# Patient Record
Sex: FEMALE | Race: WHITE | Employment: FULL TIME | URBAN - NONMETROPOLITAN AREA
[De-identification: names, ages, dates, MRNs, and addresses within clinical notes are randomized per-mention and may not be internally consistent; named-entity substitution may affect disease eponyms.]

---

## 2018-01-22 ENCOUNTER — HOSPITAL ENCOUNTER (OUTPATIENT)
Dept: GENERAL RADIOLOGY | Age: 58
Discharge: HOME | End: 2018-01-22
Payer: COMMERCIAL

## 2018-01-22 ENCOUNTER — HOSPITAL ENCOUNTER (OUTPATIENT)
Age: 58
Discharge: HOME | End: 2018-01-22
Payer: COMMERCIAL

## 2018-01-22 DIAGNOSIS — R06.02 SOB (SHORTNESS OF BREATH): ICD-10-CM

## 2018-01-22 DIAGNOSIS — R09.89 CHEST CONGESTION: ICD-10-CM

## 2018-01-22 PROCEDURE — 71046 X-RAY EXAM CHEST 2 VIEWS: CPT

## 2018-09-07 ENCOUNTER — HOSPITAL ENCOUNTER (OUTPATIENT)
Dept: LAB | Age: 58
Discharge: HOME | End: 2018-09-07
Payer: COMMERCIAL

## 2018-09-07 LAB
THYROXINE, FREE: 1.6 NG/DL (ref 0.93–1.7)
TSH SERPL DL<=0.05 MIU/L-ACNC: 0.31 MIU/L (ref 0.3–5)

## 2018-09-07 PROCEDURE — 84439 ASSAY OF FREE THYROXINE: CPT

## 2018-09-07 PROCEDURE — 36415 COLL VENOUS BLD VENIPUNCTURE: CPT

## 2018-09-07 PROCEDURE — 84443 ASSAY THYROID STIM HORMONE: CPT

## 2018-10-03 ENCOUNTER — HOSPITAL ENCOUNTER (OUTPATIENT)
Dept: WOMENS IMAGING | Age: 58
Discharge: HOME | End: 2018-10-05
Payer: COMMERCIAL

## 2018-10-03 DIAGNOSIS — Z12.39 SCREENING BREAST EXAMINATION: ICD-10-CM

## 2018-10-03 PROCEDURE — 77067 SCR MAMMO BI INCL CAD: CPT

## 2018-10-22 ENCOUNTER — HOSPITAL ENCOUNTER (OUTPATIENT)
Dept: CT IMAGING | Age: 58
Discharge: HOME | End: 2018-10-24
Payer: COMMERCIAL

## 2018-10-22 DIAGNOSIS — J84.9 INTERSTITIAL LUNG DISEASE (HCC): ICD-10-CM

## 2018-10-22 PROCEDURE — 71260 CT THORAX DX C+: CPT

## 2018-10-22 PROCEDURE — 6360000004 HC RX CONTRAST MEDICATION: Performed by: FAMILY MEDICINE

## 2018-10-22 RX ADMIN — IOPAMIDOL 75 ML: 755 INJECTION, SOLUTION INTRAVENOUS at 14:13

## 2018-11-09 PROBLEM — E04.1 THYROID NODULE: Status: ACTIVE | Noted: 2018-11-09

## 2018-11-19 ENCOUNTER — HOSPITAL ENCOUNTER (OUTPATIENT)
Dept: ULTRASOUND IMAGING | Age: 58
Discharge: HOME | End: 2018-11-21
Payer: COMMERCIAL

## 2018-11-19 DIAGNOSIS — E04.1 THYROID NODULE: ICD-10-CM

## 2018-11-19 PROCEDURE — 76536 US EXAM OF HEAD AND NECK: CPT

## 2019-07-22 ENCOUNTER — TELEPHONE (OUTPATIENT)
Dept: ORTHOPEDIC SURGERY | Age: 59
End: 2019-07-22
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
## 2019-07-26 ENCOUNTER — TELEPHONE (OUTPATIENT)
Dept: ORTHOPEDIC SURGERY | Age: 59
End: 2019-07-26

## 2019-07-26 ENCOUNTER — OFFICE VISIT (OUTPATIENT)
Dept: ORTHOPEDIC SURGERY | Age: 59
End: 2019-07-26
Payer: COMMERCIAL

## 2019-07-26 VITALS — BODY MASS INDEX: 39.39 KG/M2 | HEIGHT: 62 IN | WEIGHT: 214.07 LBS

## 2019-07-26 DIAGNOSIS — S82.891A CLOSED FRACTURE OF RIGHT ANKLE, INITIAL ENCOUNTER: ICD-10-CM

## 2019-07-26 DIAGNOSIS — S52.571A OTHER CLOSED INTRA-ARTICULAR FRACTURE OF DISTAL END OF RIGHT RADIUS, INITIAL ENCOUNTER: Primary | ICD-10-CM

## 2019-07-26 PROCEDURE — 99203 OFFICE O/P NEW LOW 30 MIN: CPT | Performed by: ORTHOPAEDIC SURGERY

## 2019-07-26 ASSESSMENT — ENCOUNTER SYMPTOMS
CHEST TIGHTNESS: 0
COUGH: 0
VOMITING: 0
ABDOMINAL PAIN: 0
APNEA: 0

## 2019-07-26 NOTE — TELEPHONE ENCOUNTER
Patient was seen with you today (7/26/19) for ORIF right wrist and ORIF of right ankle DOS: 7/14/19. Patient had surgery by another Orthopedic surgeon in [facility]. The Nurse was asking about a stop date for her Lovenox. Please advise, thank you.
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
## 2019-07-31 ENCOUNTER — OFFICE VISIT (OUTPATIENT)
Dept: ORTHOPEDIC SURGERY | Age: 59
End: 2019-07-31
Payer: COMMERCIAL

## 2019-07-31 ENCOUNTER — TELEPHONE (OUTPATIENT)
Dept: ORTHOPEDIC SURGERY | Age: 59
End: 2019-07-31

## 2019-07-31 VITALS — WEIGHT: 209 LBS | BODY MASS INDEX: 38.46 KG/M2 | HEIGHT: 62 IN
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
## 2019-07-31 DIAGNOSIS — S52.571A OTHER CLOSED INTRA-ARTICULAR FRACTURE OF DISTAL END OF RIGHT RADIUS, INITIAL ENCOUNTER: Primary | ICD-10-CM

## 2019-07-31 DIAGNOSIS — S82.891A CLOSED FRACTURE OF RIGHT ANKLE, INITIAL ENCOUNTER: ICD-10-CM

## 2019-07-31 PROCEDURE — 99213 OFFICE O/P EST LOW 20 MIN: CPT | Performed by: ORTHOPAEDIC SURGERY

## 2019-07-31 RX ORDER — ACETAMINOPHEN 325 MG/1
650 TABLET ORAL EVERY 6 HOURS PRN
COMMUNITY
End: 2020-11-03

## 2019-07-31 RX ORDER — IBUPROFEN 200 MG
200 TABLET ORAL EVERY 6 HOURS PRN
COMMUNITY
End: 2021-02-08 | Stop reason: ALTCHOICE

## 2019-07-31 ASSESSMENT — ENCOUNTER SYMPTOMS
SHORTNESS OF BREATH: 0
BACK PAIN: 0
WHEEZING: 0
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
## 2019-09-04 ENCOUNTER — OFFICE VISIT (OUTPATIENT)
Dept: ORTHOPEDIC SURGERY | Age: 59
End: 2019-09-04
Payer: COMMERCIAL

## 2019-09-04 VITALS — HEIGHT: 62 IN | BODY MASS INDEX: 38.46 KG/M2 | WEIGHT: 209 LBS

## 2019-09-04 DIAGNOSIS — S82.891A CLOSED FRACTURE OF RIGHT ANKLE, INITIAL ENCOUNTER: ICD-10-CM

## 2019-09-04 DIAGNOSIS — S52.571A OTHER CLOSED INTRA-ARTICULAR FRACTURE OF DISTAL END OF RIGHT RADIUS, INITIAL ENCOUNTER: Primary | ICD-10-CM

## 2019-09-04 PROCEDURE — 20670 REMOVAL IMPLANT SUPERFICIAL: CPT | Performed by: ORTHOPAEDIC SURGERY

## 2019-09-04 PROCEDURE — 99213 OFFICE O/P EST LOW 20 MIN: CPT | Performed by: ORTHOPAEDIC SURGERY

## 2019-09-04 ASSESSMENT — ENCOUNTER SYMPTOMS
VOMITING: 0
APNEA: 0
COUGH: 0
CHEST TIGHTNESS: 0
ABDOMINAL PAIN: 0
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
## 2019-10-09 ENCOUNTER — OFFICE VISIT (OUTPATIENT)
Dept: ORTHOPEDIC SURGERY | Age: 59
End: 2019-10-09
Payer: COMMERCIAL

## 2019-10-09 VITALS — BODY MASS INDEX: 37.89 KG/M2 | HEIGHT: 62 IN | WEIGHT: 205.91 LBS

## 2019-10-09 DIAGNOSIS — S52.571A OTHER CLOSED INTRA-ARTICULAR FRACTURE OF DISTAL END OF RIGHT RADIUS, INITIAL ENCOUNTER: Primary | ICD-10-CM

## 2019-10-09 PROCEDURE — 99213 OFFICE O/P EST LOW 20 MIN: CPT | Performed by: ORTHOPAEDIC SURGERY

## 2019-10-09 RX ORDER — CELECOXIB 200 MG/1
200 CAPSULE ORAL DAILY
Qty: 60 CAPSULE | Refills: 2 | Status: SHIPPED | OUTPATIENT
Start: 2019-10-09 | End: 2020-10-09 | Stop reason: SDUPTHER

## 2019-10-09 ASSESSMENT — ENCOUNTER SYMPTOMS
SHORTNESS OF BREATH: 0
BACK PAIN: 0
WHEEZING: 0

## 2019-11-19 DIAGNOSIS — S52.571A OTHER CLOSED INTRA-ARTICULAR FRACTURE OF DISTAL END OF RIGHT RADIUS, INITIAL ENCOUNTER: Primary | ICD-10-CM

## 2019-11-20 ENCOUNTER — OFFICE VISIT (OUTPATIENT)
Dept: ORTHOPEDIC SURGERY | Age: 59
End: 2019-11-20
Payer: COMMERCIAL

## 2019-11-20 VITALS — HEIGHT: 62 IN | WEIGHT: 205.91 LBS | BODY MASS INDEX: 37.89 KG/M2
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
## 2019-11-20 DIAGNOSIS — S52.571A OTHER CLOSED INTRA-ARTICULAR FRACTURE OF DISTAL END OF RIGHT RADIUS, INITIAL ENCOUNTER: Primary | ICD-10-CM

## 2019-11-20 PROCEDURE — 99213 OFFICE O/P EST LOW 20 MIN: CPT | Performed by: ORTHOPAEDIC SURGERY

## 2019-11-20 ASSESSMENT — ENCOUNTER SYMPTOMS
DIARRHEA: 0
NAUSEA: 0
CONSTIPATION: 0
COUGH: 0

## 2020-11-03 PROBLEM — E11.9 TYPE 2 DIABETES MELLITUS, WITHOUT LONG-TERM CURRENT USE OF INSULIN (HCC): Status: ACTIVE | Noted: 2020-11-03

## 2020-11-03 PROBLEM — I10 HYPERTENSION: Status: ACTIVE | Noted: 2020-11-03

## 2020-11-20 ENCOUNTER — HOSPITAL ENCOUNTER (OUTPATIENT)
Dept: LAB | Age: 60
Setting detail: SPECIMEN
Discharge: HOME | End: 2020-11-20

## 2020-11-20 PROCEDURE — C9803 HOPD COVID-19 SPEC COLLECT: HCPCS

## 2020-11-20 PROCEDURE — U0003 INFECTIOUS AGENT DETECTION BY NUCLEIC ACID (DNA OR RNA); SEVERE ACUTE RESPIRATORY SYNDROME CORONAVIRUS 2 (SARS-COV-2) (CORONAVIRUS DISEASE [COVID-19]), AMPLIFIED PROBE TECHNIQUE, MAKING USE OF HIGH THROUGHPUT TECHNOLOGIES AS DESCRIBED BY CMS-2020-01-R: HCPCS

## 2020-11-23 LAB — SARS-COV-2, NAA: NOT DETECTED

## 2021-05-07 ENCOUNTER — OFFICE VISIT (OUTPATIENT)
Dept: PRIMARY CARE CLINIC | Age: 61
End: 2021-05-07
Payer: COMMERCIAL

## 2021-05-07 VITALS
HEART RATE: 76 BPM | RESPIRATION RATE: 16 BRPM | WEIGHT: 221.4 LBS | DIASTOLIC BLOOD PRESSURE: 76 MMHG | SYSTOLIC BLOOD PRESSURE: 134 MMHG | BODY MASS INDEX: 39.85 KG/M2

## 2021-05-07 DIAGNOSIS — L08.9 SKIN INFECTION: ICD-10-CM

## 2021-05-07 DIAGNOSIS — Z12.31 ENCOUNTER FOR SCREENING MAMMOGRAM FOR MALIGNANT NEOPLASM OF BREAST: ICD-10-CM

## 2021-05-07 DIAGNOSIS — E11.9 TYPE 2 DIABETES MELLITUS WITHOUT COMPLICATION, WITHOUT LONG-TERM CURRENT USE OF INSULIN (HCC): ICD-10-CM

## 2021-05-07 DIAGNOSIS — I10 ESSENTIAL HYPERTENSION: Primary | ICD-10-CM

## 2021-05-07 PROCEDURE — 3051F HG A1C>EQUAL 7.0%<8.0%: CPT | Performed by: FAMILY MEDICINE

## 2021-05-07 PROCEDURE — 99213 OFFICE O/P EST LOW 20 MIN: CPT | Performed by: FAMILY MEDICINE

## 2021-05-07 RX ORDER — EMPAGLIFLOZIN AND METFORMIN HYDROCHLORIDE 12.5; 5 MG/1; MG/1
1 TABLET ORAL DAILY
Qty: 90 TABLET | Refills: 0 | Status: SHIPPED | OUTPATIENT
Start: 2021-05-07 | End: 2021-07-30 | Stop reason: SDUPTHER

## 2021-05-07 NOTE — PROGRESS NOTES
Anjelica Lindo is a 64 y.o. female here for routine follow up of diabetes. She has been checking  her blood glucose levels regularly. She said said it has varied between 108-134. She reports numbness and tingling in her arms. She thinks she may have a pinched nerve. She has been compliant with diet. She has been compliant with her medications. She is tolerating medications. Hypertension: Patient here for follow-up of elevated blood pressure. She is not exercising and is adherent to low salt diet. Blood pressure is not well controlled at home. Patient does not take blood pressure at home. Cardiac symptoms none. Patient denies chest pain, fatigue and palpitations. Cardiovascular risk factors: diabetes mellitus, hypertension and obesity (BMI >= 30 kg/m2). Use of agents associated with hypertension: none. Patient states that she has a bump on her face. She is unsure if it is a pimple or if she got bit by a spider so she would like the doctor to look at it. She said her mask also sits right where it is. She said that she has not been wearing making and uses toner every night. Allergies:  Hydrocodone-acetaminophen and Penicillin g    Past Medical History:    Past Medical History:   Diagnosis Date    Obesity        Past Surgical History:    No past surgical history on file. Social History:   Social History     Tobacco Use    Smoking status: Former Smoker     Packs/day: 2.00     Years: 25.00     Pack years: 50.00     Quit date: 10/1/2018     Years since quittin.6    Smokeless tobacco: Never Used   Substance Use Topics    Alcohol use: Not on file       Family History:   No family history on file.       Review of Systems:  Constitutional: negative for fever or chills  Eyes: negative for visual disturbance   ENT: negative for sore throat or nasal congestion  Respiratory: negative for cough, shortness of breath and sputum  Cardiovascular: negative for chest pain,pnd,claudication or complication, without long-term current use of insulin ([facility] [address]     3. Encounter for screening mammogram for malignant neoplasm of breast  ROSEANN DIGITAL SCREEN W OR WO CAD BILATERAL   4. Skin infection         · Check BS 1 times per day  · Medication change: none  · Encouraged low fat, low sodium and diabetic, 1800 calorie diet. · Goal for blood pressure control is 120/80  · Recommended regular exercise as tolerated, 5 times per week  · Labs reviewed   · Labs ordered:   ascivd risk  Orders Placed This Encounter   Procedures    ROSEANN DIGITAL SCREEN W OR WO CAD BILATERAL     Standing Status:   Future     Standing Expiration Date:   7/6/2022     Order Specific Question:   Reason for exam:     Answer:   screening       Current Outpatient Medications   Medication Sig Dispense Refill    Empagliflozin-metFORMIN HCl (SYNJARDY) 12.5-500 MG TABS Take 1 tablet by mouth daily 90 tablet 0    Multiple Vitamins-Minerals (HAIR SKIN AND NAILS FORMULA PO) Take by mouth      mupirocin (BACTROBAN) 2 % ointment Apply 3 times daily. 1 Tube 0    sertraline (ZOLOFT) 50 MG tablet Take 1 tablet by mouth daily 90 tablet 0    losartan (COZAAR) 25 MG tablet TAKE ONE TABLET BY MOUTH DAILY 90 tablet 0    Calcium Carbonate-Vitamin D (OYSTER SHELL CALCIUM/D) 500-200 MG-UNIT TABS Take 1 tablet by mouth 2 times daily (with meals)      ACETAMINOPHEN PO Take by mouth as needed      metFORMIN (GLUCOPHAGE) 500 MG tablet Take 1 tablet by mouth 2 times daily (with meals) (Patient not taking: Reported on 5/7/2021) 180 tablet 0    Glucos-Chond-Hyal Ac-Ca Fructo (MOVE FREE JOINT HEALTH ADVANCE PO) Take by mouth daily      Biotin 10 MG tablet Take 10 mg by mouth daily       No current facility-administered medications for this visit. No follow-ups on file.       Electronically signed by Isadora Wilder MD on 5/7/2021 at 9:33 AM

## 2021-06-14 RX ORDER — LOSARTAN POTASSIUM 25 MG/1
TABLET ORAL
Qty: 90 TABLET | Refills: 0 | Status: SHIPPED | OUTPATIENT
Start: 2021-06-14 | End: 2021-09-13 | Stop reason: SDUPTHER

## 2021-07-12 ENCOUNTER — TELEPHONE (OUTPATIENT)
Dept: FAMILY MEDICINE CLINIC | Age: 61
End: 2021-07-12

## 2021-07-12 NOTE — TELEPHONE ENCOUNTER
Berta Nava was contacted as part of mammography outreach. Message left on machine for patient to return call to schedule appointment.

## 2021-07-30 RX ORDER — EMPAGLIFLOZIN AND METFORMIN HYDROCHLORIDE 12.5; 5 MG/1; MG/1
1 TABLET ORAL DAILY
Qty: 90 TABLET | Refills: 0 | Status: SHIPPED | OUTPATIENT
Start: 2021-07-30 | End: 2021-09-13 | Stop reason: SDUPTHER

## 2021-08-03 LAB
ABSOLUTE BASO #: 0.2 X10E9/L (ref 0–0.2)
ABSOLUTE EOS #: 0.2 X10E9/L (ref 0–0.4)
ABSOLUTE LYMPH #: 3.3 X10E9/L (ref 1–3.5)
ABSOLUTE MONO #: 0.4 X10E9/L (ref 0–0.9)
ABSOLUTE NEUT #: 3 X10E9/L (ref 1.5–6.6)
ALBUMIN SERPL-MCNC: 4.1 G/DL (ref 3.2–5.3)
ALK PHOSPHATASE: 69 U/L (ref 39–130)
ALT SERPL-CCNC: 61 U/L (ref 0–31)
ANION GAP SERPL CALCULATED.3IONS-SCNC: 9 MMOL/L (ref 5–15)
AST SERPL-CCNC: 51 U/L (ref 0–41)
BASOPHILS RELATIVE PERCENT: 2.4 %
BILIRUB SERPL-MCNC: 0.6 MG/DL (ref 0.3–1.2)
BUN BLDV-MCNC: 14 MG/DL (ref 5–23)
CALCIUM SERPL-MCNC: 8.9 MG/DL (ref 8.5–10.5)
CHLORIDE BLD-SCNC: 105 MMOL/L (ref 98–109)
CHOLESTEROL/HDL RATIO: 4.7 (ref 1–5)
CHOLESTEROL: 177 MG/DL (ref 150–200)
CO2: 24 MMOL/L (ref 22–32)
CREAT SERPL-MCNC: 0.67 MG/DL (ref 0.4–1)
CREATINE, URINE: 58.79 MG/DL
EGFR AFRICAN AMERICAN: >60 ML/MIN/1.73SQ.M
EGFR IF NONAFRICAN AMERICAN: >60 ML/MIN/1.73SQ.M
EOSINOPHILS RELATIVE PERCENT: 2.6 %
GLUCOSE: 131 MG/DL (ref 65–99)
HCT VFR BLD CALC: 47 % (ref 35–47)
HDLC SERPL-MCNC: 38 MG/DL
HEMOGLOBIN: 15.8 G/DL (ref 11.7–15.5)
LDL CHOLESTEROL CALCULATED: 108 MG/DL
LDL/HDL RATIO: 2.8
LYMPHOCYTE %: 46.2 %
MCH RBC QN AUTO: 29.6 PG (ref 27–34)
MCHC RBC AUTO-ENTMCNC: 33.7 G/DL (ref 32–36)
MCV RBC AUTO: 88 FL (ref 80–100)
MICROALBUMIN/CREAT 24H UR: 1 MG/DL (ref 0–1.9)
MICROALBUMIN/CREAT UR-RTO: 17 MG/G CREAT (ref 0–30)
MONOCYTES # BLD: 6.2 %
NEUTROPHILS RELATIVE PERCENT: 42.6 %
PDW BLD-RTO: 14.8 % (ref 11.5–15)
PLATELETS: 161 X10E9/L (ref 150–450)
PMV BLD AUTO: 9.2 FL (ref 7–12)
POTASSIUM SERPL-SCNC: 4.2 MMOL/L (ref 3.5–5)
RBC: 5.36 X10E12/L (ref 3.8–5.2)
SODIUM BLD-SCNC: 138 MMOL/L (ref 134–146)
TOTAL PROTEIN: 7.5 G/DL (ref 6–8)
TRIGL SERPL-MCNC: 157 MG/DL (ref 27–150)
VLDLC SERPL CALC-MCNC: 31 MG/DL (ref 0–30)
WBC: 7.1 X10E9/L (ref 4–11)

## 2021-08-16 ENCOUNTER — OFFICE VISIT (OUTPATIENT)
Dept: PRIMARY CARE CLINIC | Age: 61
End: 2021-08-16
Payer: COMMERCIAL

## 2021-08-16 VITALS
DIASTOLIC BLOOD PRESSURE: 76 MMHG | SYSTOLIC BLOOD PRESSURE: 130 MMHG | RESPIRATION RATE: 16 BRPM | BODY MASS INDEX: 40.14 KG/M2 | HEART RATE: 76 BPM | WEIGHT: 223 LBS

## 2021-08-16 DIAGNOSIS — E11.9 TYPE 2 DIABETES MELLITUS WITHOUT COMPLICATION, WITHOUT LONG-TERM CURRENT USE OF INSULIN (HCC): Primary | ICD-10-CM

## 2021-08-16 DIAGNOSIS — Z12.11 SCREENING FOR MALIGNANT NEOPLASM OF COLON: ICD-10-CM

## 2021-08-16 DIAGNOSIS — I10 ESSENTIAL HYPERTENSION: ICD-10-CM

## 2021-08-16 LAB
CONTROL: PRESENT
HBA1C MFR BLD: 7.4 %
HEMOCCULT STL QL: POSITIVE

## 2021-08-16 PROCEDURE — 83036 HEMOGLOBIN GLYCOSYLATED A1C: CPT | Performed by: FAMILY MEDICINE

## 2021-08-16 PROCEDURE — 3051F HG A1C>EQUAL 7.0%<8.0%: CPT | Performed by: FAMILY MEDICINE

## 2021-08-16 PROCEDURE — 99214 OFFICE O/P EST MOD 30 MIN: CPT | Performed by: FAMILY MEDICINE

## 2021-08-16 PROCEDURE — 82274 ASSAY TEST FOR BLOOD FECAL: CPT | Performed by: FAMILY MEDICINE

## 2021-08-16 RX ORDER — ATORVASTATIN CALCIUM 20 MG/1
20 TABLET, FILM COATED ORAL DAILY
Qty: 30 TABLET | Refills: 2 | Status: SHIPPED | OUTPATIENT
Start: 2021-08-16 | End: 2021-09-13 | Stop reason: SDUPTHER

## 2021-08-16 SDOH — ECONOMIC STABILITY: TRANSPORTATION INSECURITY
IN THE PAST 12 MONTHS, HAS LACK OF TRANSPORTATION KEPT YOU FROM MEETINGS, WORK, OR FROM GETTING THINGS NEEDED FOR DAILY LIVING?: NO

## 2021-08-16 SDOH — ECONOMIC STABILITY: TRANSPORTATION INSECURITY
IN THE PAST 12 MONTHS, HAS THE LACK OF TRANSPORTATION KEPT YOU FROM MEDICAL APPOINTMENTS OR FROM GETTING MEDICATIONS?: NO

## 2021-08-16 SDOH — ECONOMIC STABILITY: FOOD INSECURITY: WITHIN THE PAST 12 MONTHS, THE FOOD YOU BOUGHT JUST DIDN'T LAST AND YOU DIDN'T HAVE MONEY TO GET MORE.: NEVER TRUE

## 2021-08-16 SDOH — ECONOMIC STABILITY: FOOD INSECURITY: WITHIN THE PAST 12 MONTHS, YOU WORRIED THAT YOUR FOOD WOULD RUN OUT BEFORE YOU GOT MONEY TO BUY MORE.: NEVER TRUE

## 2021-08-16 ASSESSMENT — SOCIAL DETERMINANTS OF HEALTH (SDOH): HOW HARD IS IT FOR YOU TO PAY FOR THE VERY BASICS LIKE FOOD, HOUSING, MEDICAL CARE, AND HEATING?: NOT HARD AT ALL

## 2021-08-16 ASSESSMENT — PATIENT HEALTH QUESTIONNAIRE - PHQ9
SUM OF ALL RESPONSES TO PHQ9 QUESTIONS 1 & 2: 0
1. LITTLE INTEREST OR PLEASURE IN DOING THINGS: 0
SUM OF ALL RESPONSES TO PHQ QUESTIONS 1-9: 0
SUM OF ALL RESPONSES TO PHQ QUESTIONS 1-9: 0
2. FEELING DOWN, DEPRESSED OR HOPELESS: 0
SUM OF ALL RESPONSES TO PHQ QUESTIONS 1-9: 0
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
## 2021-08-16 NOTE — PATIENT INSTRUCTIONS
SURVEY:    You may be receiving a survey from [employer] regarding your visit today. You may get this in the mail, through your MyChart, or in your email. Please complete the survey to enable us to provide the highest quality of care to you and your family. If you cannot score us a very good (5 Stars) on any question, please call the office to discuss how we could of made your experience exceptional.    Thank you!     SOREN Deshpande RN Izella Para, [address] [place] [address] Tomi Rojas    Phone: [phone]  Fax: [phone]    Office Hours:   Lluviacoby DelmyErinazar, F: 8-5 Wednesday: 9-11

## 2021-09-13 RX ORDER — EMPAGLIFLOZIN AND METFORMIN HYDROCHLORIDE 12.5; 5 MG/1; MG/1
1 TABLET ORAL DAILY
Qty: 90 TABLET | Refills: 0 | Status: SHIPPED | OUTPATIENT
Start: 2021-09-13 | End: 2021-10-25 | Stop reason: SDUPTHER

## 2021-09-13 RX ORDER — ATORVASTATIN CALCIUM 20 MG/1
20 TABLET, FILM COATED ORAL DAILY
Qty: 30 TABLET | Refills: 2 | Status: SHIPPED | OUTPATIENT
Start: 2021-09-13 | End: 2021-10-25 | Stop reason: SDUPTHER

## 2021-09-13 RX ORDER — LOSARTAN POTASSIUM 25 MG/1
TABLET ORAL
Qty: 90 TABLET | Refills: 0 | Status: SHIPPED | OUTPATIENT
Start: 2021-09-13 | End: 2021-11-29 | Stop reason: SDUPTHER

## 2021-10-19 ENCOUNTER — TELEPHONE (OUTPATIENT)
Dept: PRIMARY CARE CLINIC | Age: 61
End: 2021-10-19

## 2021-10-19 NOTE — TELEPHONE ENCOUNTER
This nurse called and left a message for the patient, requesting the patient call this nurse back at [phone].

## 2021-10-25 RX ORDER — EMPAGLIFLOZIN AND METFORMIN HYDROCHLORIDE 12.5; 5 MG/1; MG/1
1 TABLET ORAL DAILY
Qty: 90 TABLET | Refills: 0 | Status: SHIPPED | OUTPATIENT
Start: 2021-10-25 | End: 2021-12-30 | Stop reason: SDUPTHER

## 2021-10-25 RX ORDER — ATORVASTATIN CALCIUM 20 MG/1
20 TABLET, FILM COATED ORAL DAILY
Qty: 30 TABLET | Refills: 2 | Status: SHIPPED | OUTPATIENT
Start: 2021-10-25 | End: 2021-11-29 | Stop reason: SDUPTHER

## 2021-11-16 ENCOUNTER — OFFICE VISIT (OUTPATIENT)
Dept: PRIMARY CARE CLINIC | Age: 61
End: 2021-11-16
Payer: COMMERCIAL

## 2021-11-16 VITALS
WEIGHT: 221.8 LBS | SYSTOLIC BLOOD PRESSURE: 125 MMHG | HEART RATE: 84 BPM | DIASTOLIC BLOOD PRESSURE: 87 MMHG | BODY MASS INDEX: 39.92 KG/M2

## 2021-11-16 DIAGNOSIS — Z23 NEED FOR INFLUENZA VACCINATION: ICD-10-CM

## 2021-11-16 DIAGNOSIS — I10 ESSENTIAL HYPERTENSION: ICD-10-CM

## 2021-11-16 DIAGNOSIS — E11.9 TYPE 2 DIABETES MELLITUS WITHOUT COMPLICATION, WITHOUT LONG-TERM CURRENT USE OF INSULIN (HCC): Primary | ICD-10-CM

## 2021-11-16 LAB — HBA1C MFR BLD: 7.4 %

## 2021-11-16 PROCEDURE — 3051F HG A1C>EQUAL 7.0%<8.0%: CPT | Performed by: FAMILY MEDICINE

## 2021-11-16 PROCEDURE — 83036 HEMOGLOBIN GLYCOSYLATED A1C: CPT | Performed by: FAMILY MEDICINE

## 2021-11-16 PROCEDURE — 90471 IMMUNIZATION ADMIN: CPT | Performed by: FAMILY MEDICINE

## 2021-11-16 PROCEDURE — 90674 CCIIV4 VAC NO PRSV 0.5 ML IM: CPT | Performed by: FAMILY MEDICINE

## 2021-11-16 PROCEDURE — 99213 OFFICE O/P EST LOW 20 MIN: CPT | Performed by: FAMILY MEDICINE

## 2021-11-16 ASSESSMENT — PATIENT HEALTH QUESTIONNAIRE - PHQ9
SUM OF ALL RESPONSES TO PHQ QUESTIONS 1-9: 0
SUM OF ALL RESPONSES TO PHQ QUESTIONS 1-9: 0
SUM OF ALL RESPONSES TO PHQ9 QUESTIONS 1 & 2: 0
1. LITTLE INTEREST OR PLEASURE IN DOING THINGS: 0
SUM OF ALL RESPONSES TO PHQ QUESTIONS 1-9: 0
2. FEELING DOWN, DEPRESSED OR HOPELESS: 0

## 2021-11-16 NOTE — PROGRESS NOTES
Casimiro Delacruz is a 64 y.o. female here for routine follow up of diabetes. She has been checking  her blood glucose levels regularly. She denies numbness and tingling in the hands and feet. She has been compliant with diet and exercise. She has been compliant with her medications. She is tolerating medications. Hypertension: Patient here for follow-up of elevated blood pressure. She is exercising and trying adherent to low salt diet. Blood pressure is not well controlled at home. Cardiac symptoms none. Patient denies chest pain, fatigue and palpitations. Cardiovascular risk factors: diabetes mellitus, hypertension and obesity (BMI >= 30 kg/m2). Use of agents associated with hypertension: none. Patient states she thinks she is suppose to take Lipitor at night and says she did take it for the first few nights and says she felt it was causing restless leg syndrome. Patient states she is taking it in the morning and says she is fine. Patient states she wants to know if she can take Acetaminophen if she has a  Headache. Patient states she had a headache last night and says she didn't wake up with it. Patient states she has been trying to get off [employer] OTC sleep aid and says she sleeps well and says she bought the Melatonin to see if that was better for her and says she is taking 3 Melatonin 5 mg every night. Patient states she would like to talk about the Shingles Vaccine and Pneumonia Shot. RECHECK B/P 144/71        Allergies:  Hydrocodone-acetaminophen, Penicillin g, and Codeine    Past Medical History:    Past Medical History:   Diagnosis Date    Obesity        Past Surgical History:    No past surgical history on file.     Social History:   Social History     Tobacco Use    Smoking status: Former Smoker     Packs/day: 2.00     Years: 25.00     Pack years: 50.00     Quit date: 10/1/2018     Years since quitting: 3.1    Smokeless tobacco: Never Used   Substance Use Topics    Alcohol use: Not on file       Family History:   No family history on file. Review of Systems:  Constitutional: negative for fever or chills  Eyes: negative for visual disturbance   ENT: negative for sore throat or nasal congestion  Respiratory: negative for cough, shortness of breath and sputum  Cardiovascular: negative for chest pain,pnd,claudication or palpitations  Gastrointestinal: negative for abd pain, lucy,nausea, vomiting, diarrhea or constipation  Genitourinary: negative for dysuria,,hematuria urgency or frequency  Musculoskeletal:negative for arthralgias, muscle weakness and stiff joints   Integument/breast: negative for skin rash or lesions  Neurological: negtive for   numbness and tingling. Psych: negative for anxiety, depression, suicidial ideation and suicidal attempt. Objective:  Physical Exam:  /87 (Site: Left Upper Arm, Position: Sitting) Comment: reheck  Pulse 84   Wt 221 lb 12.8 oz (100.6 kg)   BMI 39.92 kg/m²   GEN:   She is alert and oriented  ENT:    ENT exam normal, no neck nodes or sinus tenderness  NECK:   neck supple and non tender without mass, no thyromegaly or thyroid nodules, no cervical lymphadenopathy  EYES:   No gross abnormalities. CVS:     CVS exam BP noted to be well controlled today in office, S1, S2 normal, no gallop, no murmur, chest clear, no JVD, no HSM, no edema  PULM:   chest clear, no wheezing, rales, normal symmetric air entry, no tachypnea, retractions or cyanosis  ABD:   exam deferred  MUSC: no joint tenderness, deformity or swelling  Skin : neg for  rash or lesions  EXT: {EXTREMITIES EXAM:20816::\"Extremities: + 2 pedal pulses, no edema or calf tenderness, and warm to touch.   FEET:  no open sores are present bilaterally  NEURO:DTR -normal        Diagnostic Data:  Lab Results   Component Value Date    GLUCOSE 131 (H) 08/02/2021    LABA1C 7.4 08/16/2021    BUN 14 08/02/2021     08/02/2021    K 4.2 08/02/2021    CALCIUM 8.9 08/02/2021     08/02/2021    CO2 24 08/02/2021       Assessment:  1. Type 2 diabetes mellitus without complication, without long-term current use of insulin ([address]    2. Essential hypertension      Plan   Diagnosis Orders   1. Type 2 diabetes mellitus without complication, without long-term current use of insulin ([address] - well controlled with synjardy    2. Essential hypertension - well controlled with cozaar        · Check BS 1 times per day  · Medication change: none  · Encouraged low fat, low sodium and diabetic, 1800 calorie diet. · Goal for blood pressure control is 130/80  · Recommended regular exercise as tolerated, 5 times per week  · Labs reviewed- [id number]  · Flu vacine  ascivd risk  No orders of the defined types were placed in this encounter. Current Outpatient Medications   Medication Sig Dispense Refill    Empagliflozin-metFORMIN HCl (SYNJARDY) 12.5-500 MG TABS Take 1 tablet by mouth daily 90 tablet 0    atorvastatin (LIPITOR) 20 MG tablet Take 1 tablet by mouth daily 30 tablet 2    sertraline (ZOLOFT) 50 MG tablet Take 1 tablet by mouth daily 90 tablet 0    losartan (COZAAR) 25 MG tablet TAKE ONE TABLET BY MOUTH DAILY 90 tablet 0    Multiple Vitamins-Minerals (HAIR SKIN AND NAILS FORMULA PO) Take by mouth      Glucos-Chond-Hyal Ac-Ca Fructo (MOVE FREE JOINT HEALTH ADVANCE PO) Take by mouth daily      ACETAMINOPHEN PO Take by mouth as needed       No current facility-administered medications for this visit. No follow-ups on file.       Electronically signed by Susan Mary MD on 11/16/2021 at 9:54 AM

## 2021-11-16 NOTE — PATIENT INSTRUCTIONS
SURVEY:    You may be receiving a survey from [employer] regarding your visit today. You may get this in the mail, through your MyChart, or in your email. Please complete the survey to enable us to provide the highest quality of care to you and your family. If you cannot score us a very good (5 Stars) on any question, please call the office to discuss how we could of made your experience exceptional.    Thank you!     Dr. Edwardo Wilcox, SOREN Shea, JESSICA Victor, [address] [place] [address], [address]    Phone: [phone]  Fax: [phone]    Office Hours:   Serg Riggins, F: 8-5 Wednesday: 9-11

## 2021-11-29 RX ORDER — LOSARTAN POTASSIUM 25 MG/1
TABLET ORAL
Qty: 90 TABLET | Refills: 0 | Status: SHIPPED | OUTPATIENT
Start: 2021-11-29 | End: 2022-03-15 | Stop reason: SDUPTHER

## 2021-11-29 RX ORDER — ATORVASTATIN CALCIUM 20 MG/1
20 TABLET, FILM COATED ORAL DAILY
Qty: 90 TABLET | Refills: 0 | Status: SHIPPED | OUTPATIENT
Start: 2021-11-29 | End: 2022-03-04 | Stop reason: SDUPTHER

## 2021-12-30 RX ORDER — EMPAGLIFLOZIN AND METFORMIN HYDROCHLORIDE 12.5; 5 MG/1; MG/1
1 TABLET ORAL DAILY
Qty: 90 TABLET | Refills: 0 | Status: SHIPPED | OUTPATIENT
Start: 2021-12-30 | End: 2022-02-07 | Stop reason: SDUPTHER

## 2021-12-30 NOTE — TELEPHONE ENCOUNTER
Alessandro LERMA [facility] Clinical Staff  Subject: Refill Request     QUESTIONS   Name of Medication? sertraline (ZOLOFT) 50 MG tablet   Patient-reported dosage and instructions? Takes once a day 90 day supply   How many days do you have left? 10   Preferred Pharmacy? [address] phone number (if available)? [phone]   ---------------------------------------------------------------------------   --------------,   Name of Medication? Empagliflozin-metFORMIN HCl (SYNJARDY) 12.5-500 MG   TABS   Patient-reported dosage and instructions? Takes once a day 90 day supply   How many days do you have left? 18   Preferred Pharmacy? [address] phone number (if available)? [phone]   ---------------------------------------------------------------------------   --------------   CALL BACK INFO   What is the best way for the office to contact you? OK to leave message on   voicemail   Preferred Call Back Phone Number?  [phone]

## 2022-02-07 RX ORDER — EMPAGLIFLOZIN AND METFORMIN HYDROCHLORIDE 12.5; 5 MG/1; MG/1
1 TABLET ORAL DAILY
Qty: 90 TABLET | Refills: 0 | Status: SHIPPED | OUTPATIENT
Start: 2022-02-07 | End: 2022-04-25 | Stop reason: SDUPTHER

## 2022-02-07 NOTE — TELEPHONE ENCOUNTER
Roma LERMA [facility] Clinical Staff  Subject: Refill Request     QUESTIONS   Name of Medication? Empagliflozin-metFORMIN HCl (SYNJARDY) 12.5-500 MG   TABS   Patient-reported dosage and instructions? 12.5-500mg once a day   How many days do you have left? 4   Preferred Pharmacy? [address] [facility] phone number (if available)? [phone]   ---------------------------------------------------------------------------   --------------   CALL BACK INFO   What is the best way for the office to contact you? OK to leave message on   voicemail   Preferred Call Back Phone Number?  [phone]

## 2022-03-04 RX ORDER — ATORVASTATIN CALCIUM 20 MG/1
20 TABLET, FILM COATED ORAL DAILY
Qty: 90 TABLET | Refills: 0 | Status: SHIPPED
Start: 2022-03-04 | End: 2022-04-25 | Stop reason: SINTOL

## 2022-03-15 RX ORDER — LOSARTAN POTASSIUM 25 MG/1
TABLET ORAL
Qty: 90 TABLET | Refills: 0 | Status: SHIPPED | OUTPATIENT
Start: 2022-03-15 | End: 2022-06-06 | Stop reason: SDUPTHER

## 2022-04-25 ENCOUNTER — OFFICE VISIT (OUTPATIENT)
Dept: PRIMARY CARE CLINIC | Age: 62
End: 2022-04-25
Payer: COMMERCIAL

## 2022-04-25 VITALS
WEIGHT: 229 LBS | SYSTOLIC BLOOD PRESSURE: 134 MMHG | DIASTOLIC BLOOD PRESSURE: 82 MMHG | HEIGHT: 62 IN | RESPIRATION RATE: 16 BRPM | HEART RATE: 94 BPM | BODY MASS INDEX: 42.14 KG/M2

## 2022-04-25 DIAGNOSIS — E11.9 TYPE 2 DIABETES MELLITUS WITHOUT COMPLICATION, WITHOUT LONG-TERM CURRENT USE OF INSULIN (HCC): Primary | ICD-10-CM

## 2022-04-25 DIAGNOSIS — I10 ESSENTIAL HYPERTENSION: ICD-10-CM

## 2022-04-25 LAB — HBA1C MFR BLD: 7.9 %

## 2022-04-25 PROCEDURE — 3051F HG A1C>EQUAL 7.0%<8.0%: CPT | Performed by: FAMILY MEDICINE

## 2022-04-25 PROCEDURE — 83036 HEMOGLOBIN GLYCOSYLATED A1C: CPT | Performed by: FAMILY MEDICINE

## 2022-04-25 PROCEDURE — 99214 OFFICE O/P EST MOD 30 MIN: CPT | Performed by: FAMILY MEDICINE

## 2022-04-25 RX ORDER — ROSUVASTATIN CALCIUM 5 MG/1
5 TABLET, COATED ORAL NIGHTLY
Qty: 30 TABLET | Refills: 2 | Status: SHIPPED | OUTPATIENT
Start: 2022-04-25 | End: 2022-07-26 | Stop reason: SDUPTHER

## 2022-04-25 RX ORDER — EMPAGLIFLOZIN AND METFORMIN HYDROCHLORIDE 12.5; 5 MG/1; MG/1
1 TABLET ORAL DAILY
Qty: 180 TABLET | Refills: 0 | Status: SHIPPED | OUTPATIENT
Start: 2022-04-25 | End: 2022-05-20 | Stop reason: SDUPTHER

## 2022-04-25 NOTE — PROGRESS NOTES
Julia Lamar is a 63 y.o. female here for routine follow up of diabetes. She has been checking  her blood glucose levels regularly. She states it was 155 this morning. She denies numbness and tingling in the hands and feet. She has been compliant with diet as far as the sugar. She has been compliant with her medications. She is tolerating medications. Hypertension: Patient here for follow-up of elevated blood pressure. She is not exercising and is adherent to low salt diet. Blood pressure is not well controlled at home. Patient does not check her blood pressure at home. Cardiac symptoms none. Patient denies chest pain, fatigue and palpitations. Cardiovascular risk factors: diabetes mellitus, hypertension and obesity (BMI >= 30 kg/m2). Use of agents associated with hypertension: none. Patient states that her cholesterol medication is making her fatigued. Allergies:  Hydrocodone-acetaminophen, Penicillin g, and Codeine    Past Medical History:    Past Medical History:   Diagnosis Date    Obesity        Past Surgical History:    No past surgical history on file. Social History:   Social History     Tobacco Use    Smoking status: Former Smoker     Packs/day: 2.00     Years: 25.00     Pack years: 50.00     Quit date: 10/1/2018     Years since quitting: 3.5    Smokeless tobacco: Never Used   Substance Use Topics    Alcohol use: Not on file       Family History:   No family history on file.       Review of Systems:  Constitutional: negative for fever or chills  Eyes: negative for visual disturbance   ENT: negative for sore throat or nasal congestion  Respiratory: negative for cough, shortness of breath and sputum  Cardiovascular: negative for chest pain,pnd,claudication or palpitations  Gastrointestinal: negative for abd pain, lucy,nausea, vomiting, diarrhea or constipation  Genitourinary: negative for dysuria,,hematuria urgency or frequency  Musculoskeletal:negative for 1 times per day  · Medication change: d/c lipitor due to fatigue,start crestor 5 mg daily  · Encouraged low fat, low sodium and diabetic, 1800 calorie diet. · Goal for blood pressure control is 120/80  · Recommended regular exercise as tolerated, 5 times per week  · Labs reviewed -hba1c 7.9  · Labs ordered: tsh  ascivd risk  Orders Placed This Encounter   Procedures    TSH With Reflex Ft4     Standing Status:   Future     Standing Expiration Date:   4/25/2023    POCT glycosylated hemoglobin (Hb A1C)       Current Outpatient Medications   Medication Sig Dispense Refill    rosuvastatin (CRESTOR) 5 MG tablet Take 1 tablet by mouth nightly 30 tablet 2    Empagliflozin-metFORMIN HCl (SYNJARDY) 12.5-500 MG TABS Take 1 tablet by mouth daily 180 tablet 0    sertraline (ZOLOFT) 50 MG tablet Take 1 tablet by mouth daily 90 tablet 0    losartan (COZAAR) 25 MG tablet TAKE ONE TABLET BY MOUTH DAILY 90 tablet 0    ACETAMINOPHEN PO Take by mouth as needed      Glucos-Chond-Hyal Ac-Ca Fructo (MOVE FREE [facility] HEALTH ADVANCE PO) Take by mouth daily       No current facility-administered medications for this visit. Return in about 3 months (around 7/25/2022) for diabetes, hypertension.       Electronically signed by Aielen Banks MD on 4/25/2022 at 9:24 AM

## 2022-04-25 NOTE — PATIENT INSTRUCTIONS
SURVEY:    You may be receiving a survey from [employer] regarding your visit today. You may get this in the mail, through your MyChart, or in your email. Please complete the survey to enable us to provide the highest quality of care to you and your family. If you cannot score us a very good (5 Stars) on any question, please call the office to discuss how we could of made your experience exceptional.    Thank you!     Dr. Brisa Edward, MANN  JESSICA Downs [facility], [address] [place] [address] Tomi Rojas    Phone: [phone]  Fax: [phone]    Office Hours:   Serg Bailon, F: 8-5 Wednesday: 9-11

## 2022-05-20 ENCOUNTER — TELEPHONE (OUTPATIENT)
Dept: PRIMARY CARE CLINIC | Age: 62
End: 2022-05-20

## 2022-05-20 RX ORDER — EMPAGLIFLOZIN AND METFORMIN HYDROCHLORIDE 12.5; 5 MG/1; MG/1
1 TABLET ORAL 2 TIMES DAILY
Qty: 180 TABLET | Refills: 0 | Status: SHIPPED | OUTPATIENT
Start: 2022-05-20 | End: 2022-06-06 | Stop reason: SDUPTHER

## 2022-05-20 NOTE — TELEPHONE ENCOUNTER
Patient states she was advised to take Synjardy BID at her last appointment, okay to send new prescription for BID dosing to [facility]?

## 2022-05-28 ENCOUNTER — HOSPITAL ENCOUNTER (OUTPATIENT)
Age: 62
Discharge: HOME | End: 2022-05-28
Payer: COMMERCIAL

## 2022-05-28 DIAGNOSIS — I10 ESSENTIAL HYPERTENSION: ICD-10-CM

## 2022-05-28 LAB — TSH SERPL DL<=0.05 MIU/L-ACNC: 0.73 UIU/ML (ref 0.3–5)

## 2022-05-28 PROCEDURE — 84443 ASSAY THYROID STIM HORMONE: CPT

## 2022-05-28 PROCEDURE — 36415 COLL VENOUS BLD VENIPUNCTURE: CPT

## 2022-06-06 RX ORDER — EMPAGLIFLOZIN AND METFORMIN HYDROCHLORIDE 12.5; 5 MG/1; MG/1
1 TABLET ORAL 2 TIMES DAILY
Qty: 180 TABLET | Refills: 0 | Status: SHIPPED | OUTPATIENT
Start: 2022-06-06 | End: 2022-09-04

## 2022-06-06 RX ORDER — LOSARTAN POTASSIUM 25 MG/1
TABLET ORAL
Qty: 90 TABLET | Refills: 0 | Status: SHIPPED | OUTPATIENT
Start: 2022-06-06 | End: 2022-08-25

## 2022-07-26 RX ORDER — ROSUVASTATIN CALCIUM 5 MG/1
5 TABLET, COATED ORAL NIGHTLY
Qty: 30 TABLET | Refills: 2 | Status: SHIPPED | OUTPATIENT
Start: 2022-07-26 | End: 2022-09-25 | Stop reason: SDUPTHER

## 2022-08-01 ENCOUNTER — OFFICE VISIT (OUTPATIENT)
Dept: PRIMARY CARE CLINIC | Age: 62
End: 2022-08-01
Payer: COMMERCIAL

## 2022-08-01 VITALS — WEIGHT: 227.2 LBS | DIASTOLIC BLOOD PRESSURE: 80 MMHG | BODY MASS INDEX: 41.56 KG/M2 | SYSTOLIC BLOOD PRESSURE: 122 MMHG

## 2022-08-01 DIAGNOSIS — Z12.11 SCREENING FOR MALIGNANT NEOPLASM OF COLON: ICD-10-CM

## 2022-08-01 DIAGNOSIS — Z12.11 SCREENING FOR COLON CANCER: ICD-10-CM

## 2022-08-01 DIAGNOSIS — E11.9 TYPE 2 DIABETES MELLITUS WITHOUT COMPLICATION, WITHOUT LONG-TERM CURRENT USE OF INSULIN (HCC): Primary | ICD-10-CM

## 2022-08-01 DIAGNOSIS — I10 ESSENTIAL HYPERTENSION: ICD-10-CM

## 2022-08-01 LAB — HBA1C MFR BLD: 6.6 %

## 2022-08-01 PROCEDURE — 83036 HEMOGLOBIN GLYCOSYLATED A1C: CPT | Performed by: FAMILY MEDICINE

## 2022-08-01 PROCEDURE — 99214 OFFICE O/P EST MOD 30 MIN: CPT | Performed by: FAMILY MEDICINE

## 2022-08-01 PROCEDURE — 3044F HG A1C LEVEL LT 7.0%: CPT | Performed by: FAMILY MEDICINE

## 2022-08-01 ASSESSMENT — PATIENT HEALTH QUESTIONNAIRE - PHQ9
1. LITTLE INTEREST OR PLEASURE IN DOING THINGS: 0
SUM OF ALL RESPONSES TO PHQ QUESTIONS 1-9: 1
SUM OF ALL RESPONSES TO PHQ9 QUESTIONS 1 & 2: 1
SUM OF ALL RESPONSES TO PHQ QUESTIONS 1-9: 1
2. FEELING DOWN, DEPRESSED OR HOPELESS: 1

## 2022-08-01 NOTE — PROGRESS NOTES
arthralgias, muscle weakness and stiff joints   Integument/breast: negative for skin rash or lesions  Neurological: negativetive for   numbness and tingling. Psych: negative for anxiety, depression, suicidial ideation and suicidal attempt. Objective:  Physical Exam:  /80 (Site: Left Upper Arm, Position: Sitting)   Wt 227 lb 3.2 oz (103.1 kg)   BMI 41.56 kg/m²   GEN:   She is alert and oriented  ENT:    ENT exam normal, no neck nodes or sinus tenderness  NECK:   neck supple and non tender without mass, no thyromegaly or thyroid nodules, no cervical lymphadenopathy  EYES:   No gross abnormalities. CVS:     CVS exam BP noted to be well controlled today in office, S1, S2 normal, no gallop, no murmur, chest clear, no JVD, no HSM, no edema  PULM:   chest clear, no wheezing, rales, normal symmetric air entry  ABD:   Abdomen soft, non-tender. BS normal. No masses,  No organomegaly  MUSC: no joint tenderness, deformity or swelling  Skin : no  rash or lesions  EXT: {EXTREMITIES EXAM:20816::\"Extremities: + 2 pedal pulses, no edema or calf tenderness, and warm to touch. FEET:  no open sores are present bilaterally  NEURO: monofilament normal bilaterally DTR -normal        Diagnostic Data:  Lab Results   Component Value Date    GLUCOSE 131 (H) 08/02/2021    LABA1C 6.6 08/01/2022    BUN 14 08/02/2021     08/02/2021    K 4.2 08/02/2021    CALCIUM 8.9 08/02/2021     08/02/2021    CO2 24 08/02/2021       Assessment:  1. Type 2 diabetes mellitus without complication, without long-term current use of insulin ([facility] [address]    2. Essential hypertension    3. Screening for colon cancer    4. Screening for malignant neoplasm of colon      Plan   Diagnosis Orders   1. Type 2 diabetes mellitus without complication, without long-term current use of insulin ([facility])  POCT glycosylated hemoglobin (Hb A1C)      2. Essential hypertension        3.  Screening for colon cancer  Fecal DNA Colorectal cancer screening (Cologuard) 4. Screening for malignant neoplasm of colon  [facility] CHANTELL DIGITAL SCREEN BILATERAL          Check BS 1 times per day  Medication change: none  Encouraged low fat, low sodium, and diabetic, 1800 calorie diet. Goal for blood pressure control is 130/80  Recommended regular exercise as tolerated, 5 times per week  Labs reviewed hba1c 6.6  Labs ordered: cologuasaige  ascivd risk  Orders Placed This Encounter   Procedures    Fecal DNA Colorectal cancer screening (Cologuard)    [facility] CHANTELL DIGITAL SCREEN BILATERAL     Standing Status:   Future     Standing Expiration Date:   10/1/2023    POCT glycosylated hemoglobin (Hb A1C)       Current Outpatient Medications   Medication Sig Dispense Refill    rosuvastatin (CRESTOR) 5 MG tablet Take 1 tablet by mouth nightly 30 tablet 2    sertraline (ZOLOFT) 50 MG tablet Take 1 tablet by mouth daily 90 tablet 0    losartan (COZAAR) 25 MG tablet TAKE ONE TABLET BY MOUTH DAILY 90 tablet 0    Empagliflozin-metFORMIN HCl (SYNJARDY) 12.5-500 MG TABS Take 1 tablet by mouth in the morning and at bedtime 180 tablet 0    ACETAMINOPHEN PO Take by mouth as needed      Glucos-Chond-Hyal Ac-Ca Fructo (MOVE FREE [facility] ADVANCE PO) Take by mouth daily       No current facility-administered medications for this visit. Return in about 3 months (around 11/1/2022) for diabetes, hypertension.       Electronically signed by Erendira Fiore MD on 8/1/2022 at 9:11 AM

## 2022-08-16 DIAGNOSIS — Z12.31 ENCOUNTER FOR SCREENING MAMMOGRAM FOR MALIGNANT NEOPLASM OF BREAST: Primary | ICD-10-CM

## 2022-08-25 RX ORDER — LOSARTAN POTASSIUM 25 MG/1
TABLET ORAL
Qty: 90 TABLET | Refills: 0 | Status: SHIPPED | OUTPATIENT
Start: 2022-08-25

## 2022-09-06 RX ORDER — EMPAGLIFLOZIN AND METFORMIN HYDROCHLORIDE 12.5; 5 MG/1; MG/1
1 TABLET ORAL 2 TIMES DAILY
Qty: 180 TABLET | Refills: 0 | Status: SHIPPED | OUTPATIENT
Start: 2022-09-06 | End: 2022-12-05

## 2022-09-26 RX ORDER — ROSUVASTATIN CALCIUM 5 MG/1
5 TABLET, COATED ORAL NIGHTLY
Qty: 30 TABLET | Refills: 2 | OUTPATIENT
Start: 2022-09-26

## 2022-09-26 RX ORDER — ROSUVASTATIN CALCIUM 5 MG/1
5 TABLET, COATED ORAL NIGHTLY
Qty: 30 TABLET | Refills: 2 | Status: SHIPPED | OUTPATIENT
Start: 2022-09-26 | End: 2022-10-24 | Stop reason: SDUPTHER

## 2022-10-06 ENCOUNTER — HOSPITAL ENCOUNTER (OUTPATIENT)
Dept: WOMENS IMAGING | Age: 62
Discharge: HOME | End: 2022-10-08
Payer: COMMERCIAL

## 2022-10-06 DIAGNOSIS — Z12.31 ENCOUNTER FOR SCREENING MAMMOGRAM FOR MALIGNANT NEOPLASM OF BREAST: ICD-10-CM

## 2022-10-06 PROCEDURE — 77063 BREAST TOMOSYNTHESIS BI: CPT

## 2022-10-24 RX ORDER — ROSUVASTATIN CALCIUM 5 MG/1
5 TABLET, COATED ORAL NIGHTLY
Qty: 30 TABLET | Refills: 2 | Status: SHIPPED | OUTPATIENT
Start: 2022-10-24 | End: 2022-11-01 | Stop reason: SDUPTHER

## 2022-11-01 ENCOUNTER — OFFICE VISIT (OUTPATIENT)
Dept: PRIMARY CARE CLINIC | Age: 62
End: 2022-11-01
Payer: COMMERCIAL

## 2022-11-01 VITALS
TEMPERATURE: 97.1 F | BODY MASS INDEX: 42.62 KG/M2 | HEART RATE: 83 BPM | WEIGHT: 231.6 LBS | SYSTOLIC BLOOD PRESSURE: 133 MMHG | DIASTOLIC BLOOD PRESSURE: 87 MMHG | HEIGHT: 62 IN | OXYGEN SATURATION: 98 % | RESPIRATION RATE: 18 BRPM

## 2022-11-01 DIAGNOSIS — Z23 NEED FOR INFLUENZA VACCINATION: ICD-10-CM

## 2022-11-01 DIAGNOSIS — I10 HYPERTENSION, UNSPECIFIED TYPE: ICD-10-CM

## 2022-11-01 DIAGNOSIS — E11.9 TYPE 2 DIABETES MELLITUS WITHOUT COMPLICATION, WITHOUT LONG-TERM CURRENT USE OF INSULIN (HCC): Primary | ICD-10-CM

## 2022-11-01 PROCEDURE — 3074F SYST BP LT 130 MM HG: CPT | Performed by: FAMILY MEDICINE

## 2022-11-01 PROCEDURE — 90674 CCIIV4 VAC NO PRSV 0.5 ML IM: CPT | Performed by: FAMILY MEDICINE

## 2022-11-01 PROCEDURE — 3078F DIAST BP <80 MM HG: CPT | Performed by: FAMILY MEDICINE

## 2022-11-01 PROCEDURE — 3044F HG A1C LEVEL LT 7.0%: CPT | Performed by: FAMILY MEDICINE

## 2022-11-01 PROCEDURE — 99213 OFFICE O/P EST LOW 20 MIN: CPT | Performed by: FAMILY MEDICINE

## 2022-11-01 PROCEDURE — 90471 IMMUNIZATION ADMIN: CPT | Performed by: FAMILY MEDICINE

## 2022-11-01 RX ORDER — ROSUVASTATIN CALCIUM 5 MG/1
5 TABLET, COATED ORAL NIGHTLY
Qty: 90 TABLET | Refills: 2 | Status: SHIPPED | OUTPATIENT
Start: 2022-11-01 | End: 2023-01-30

## 2022-11-01 SDOH — ECONOMIC STABILITY: FOOD INSECURITY: WITHIN THE PAST 12 MONTHS, THE FOOD YOU BOUGHT JUST DIDN'T LAST AND YOU DIDN'T HAVE MONEY TO GET MORE.: NEVER TRUE

## 2022-11-01 SDOH — ECONOMIC STABILITY: FOOD INSECURITY: WITHIN THE PAST 12 MONTHS, YOU WORRIED THAT YOUR FOOD WOULD RUN OUT BEFORE YOU GOT MONEY TO BUY MORE.: NEVER TRUE

## 2022-11-01 ASSESSMENT — SOCIAL DETERMINANTS OF HEALTH (SDOH): HOW HARD IS IT FOR YOU TO PAY FOR THE VERY BASICS LIKE FOOD, HOUSING, MEDICAL CARE, AND HEATING?: NOT HARD AT ALL

## 2022-11-01 NOTE — PATIENT INSTRUCTIONS
SURVEY:    You may be receiving a survey from [employer] regarding your visit today. Please complete the survey to enable us to provide the highest quality of care to you and your family. If you cannot score us a very good on any question, please call the office to discuss how we could have made your experience a very good one. Thank you.   Sona Carroll LPN

## 2022-11-01 NOTE — PROGRESS NOTES
Hypertension (Patient is here for follow up for HTN. Patient reports lump under chin and feel it has increased in size. )     Hypertension: Patient here for follow-up of elevated blood pressure. She is not exercising and is adherent to low salt diet. Blood pressure is well controlled at home. Cardiac symptoms none. Patient denies none. Cardiovascular risk factors: none. Use of agents associated with hypertension: none. History of target organ damage: none. Type 2 dm- home blood sugars well controlled,has been following diet and exercising        Allergies:  Hydrocodone-acetaminophen, Penicillin g, and Codeine    Past Medical History:    Past Medical History:   Diagnosis Date    Obesity        Past Surgical History:    Past Surgical History:   Procedure Laterality Date    BREAST BIOPSY         Social History:   Social History     Tobacco Use    Smoking status: Former     Packs/day: 2.00     Years: 25.00     Pack years: 50.00     Types: Cigarettes     Quit date: 10/1/2018     Years since quittin.0    Smokeless tobacco: Never   Substance Use Topics    Alcohol use: Not on file       Family History:   Family History   Problem Relation Age of Onset    Coronary Art Dis Father     Breast Cancer Paternal Grandmother          Review of Systems:  Constitutional: negative for fever or chills  Eyes: negative for visual disturbance   ENT: negative for sore throat or nasal congestion  Respiratory: negative for cough, shortness of breath and sputum  Cardiovascular: negative for chest pain,pnd,claudication or palpitations  Gastrointestinal: negative for abd pain, lucy,nausea, vomiting, diarrhea or constipation  Genitourinary: negative for dysuria,,hematuria urgency or frequency  Musculoskeletal:negative for arthralgias, muscle weakness and stiff joints   Integument/breast: negative for skin rash or lesions  Neurological: negative for   numbness and tingling.   Psych: negative for anxiety, depression, suicidial ideation and suicidal attempt. Objective:  Physical Exam:  /87 (Site: Right Upper Arm, Position: Sitting, Cuff Size: Large Adult)   Pulse 83   Temp 97.1 °F (36.2 °C)   Resp 18   Ht 5' 2\" (1.575 m)   Wt 231 lb 9.6 oz (105.1 kg)   SpO2 98%   BMI 42.36 kg/m²   GEN:   She is alert and oriented  ENT:    ENT exam normal, no neck nodes or sinus tenderness  NECK:   neck supple and non tender without mass, no thyromegaly or thyroid nodules, no cervical lymphadenopathy, has cyst under chin  EYES:   No gross abnormalities. and PERRL  CVS:     CVS exam BP noted to be well controlled today in office, S1, S2 normal, no gallop, no murmur, chest clear, no JVD, no HSM, no edema  PULM:   chest clear, no wheezing, rales, normal symmetric air entry  ABD:   Abdomen soft, non-tender. BS normal. No masses,  No organomegaly  MUSC: no joint tenderness, deformity or swelling  Skin : no  rash or lesions, has cyst under chin  EXT: {EXTREMITIES EXAM:20816::\"Extremities: + 2 pedal pulses, no edema or calf tenderness, and warm to touch. NEURO:  DTR -normal        Diagnostic Data:  Lab Results   Component Value Date    GLUCOSE 131 (H) 08/02/2021    LABA1C 6.6 08/01/2022    BUN 14 08/02/2021     08/02/2021    K 4.2 08/02/2021    CALCIUM 8.9 08/02/2021     08/02/2021    CO2 24 08/02/2021       Assessment:  1. Type 2 diabetes mellitus without complication, without long-term current use of insulin ([address]    2. Hypertension, unspecified type [I10 (ICD-10-CM)]      Plan   Diagnosis Orders   1. Type 2 diabetes mellitus without complication, without long-term current use of insulin ([address]        2. Hypertension, unspecified type [I10 (ICD-10-CM)]            Check BS 1 times per day  Encouraged low fat, low sodium, and diabetic, 1800 calorie diet.   Goal for blood pressure control is 130/80  Recommended regular exercise as tolerated, 5 times per week  Flu vaccine  ascivd risk  No orders of the defined types were placed in this encounter. Current Outpatient Medications   Medication Sig Dispense Refill    rosuvastatin (CRESTOR) 5 MG tablet Take 1 tablet by mouth nightly 30 tablet 2    sertraline (ZOLOFT) 50 MG tablet Take 1 tablet by mouth daily 90 tablet 0    Empagliflozin-metFORMIN HCl (SYNJARDY) 12.5-500 MG TABS Take 1 tablet by mouth in the morning and at bedtime 180 tablet 0    losartan (COZAAR) 25 MG tablet TAKE ONE TABLET BY MOUTH DAILY 90 tablet 0    ACETAMINOPHEN PO Take by mouth as needed      Glucos-Chond-Hyal Ac-Ca Fructo (MOVE FREE JOINT HEALTH ADVANCE PO) Take by mouth daily       No current facility-administered medications for this visit. No follow-ups on file.       Electronically signed by Emily Hammer MD on 11/1/2022 at 8:51 AM

## 2022-12-08 RX ORDER — LOSARTAN POTASSIUM 25 MG/1
TABLET ORAL
Qty: 90 TABLET | Refills: 0 | Status: SHIPPED | OUTPATIENT
Start: 2022-12-08

## 2022-12-08 RX ORDER — EMPAGLIFLOZIN AND METFORMIN HYDROCHLORIDE 12.5; 5 MG/1; MG/1
1 TABLET ORAL 2 TIMES DAILY
Qty: 180 TABLET | Refills: 0 | Status: SHIPPED | OUTPATIENT
Start: 2022-12-08 | End: 2023-03-08

## 2023-02-06 ENCOUNTER — OFFICE VISIT (OUTPATIENT)
Dept: PRIMARY CARE CLINIC | Age: 63
End: 2023-02-06
Payer: COMMERCIAL

## 2023-02-06 VITALS
BODY MASS INDEX: 42.76 KG/M2 | SYSTOLIC BLOOD PRESSURE: 126 MMHG | OXYGEN SATURATION: 98 % | RESPIRATION RATE: 18 BRPM | HEART RATE: 85 BPM | DIASTOLIC BLOOD PRESSURE: 82 MMHG | WEIGHT: 232.4 LBS | HEIGHT: 62 IN

## 2023-02-06 DIAGNOSIS — E11.9 TYPE 2 DIABETES MELLITUS WITHOUT COMPLICATION, WITHOUT LONG-TERM CURRENT USE OF INSULIN (HCC): Primary | ICD-10-CM

## 2023-02-06 DIAGNOSIS — I10 HYPERTENSION, UNSPECIFIED TYPE: ICD-10-CM

## 2023-02-06 LAB — HBA1C MFR BLD: 7.1 %

## 2023-02-06 PROCEDURE — 83036 HEMOGLOBIN GLYCOSYLATED A1C: CPT | Performed by: FAMILY MEDICINE

## 2023-02-06 PROCEDURE — 3074F SYST BP LT 130 MM HG: CPT | Performed by: FAMILY MEDICINE

## 2023-02-06 PROCEDURE — 3051F HG A1C>EQUAL 7.0%<8.0%: CPT | Performed by: FAMILY MEDICINE

## 2023-02-06 PROCEDURE — 3079F DIAST BP 80-89 MM HG: CPT | Performed by: FAMILY MEDICINE

## 2023-02-06 PROCEDURE — 99213 OFFICE O/P EST LOW 20 MIN: CPT | Performed by: FAMILY MEDICINE

## 2023-02-06 RX ORDER — ROSUVASTATIN CALCIUM 5 MG/1
5 TABLET, COATED ORAL NIGHTLY
Qty: 90 TABLET | Refills: 2 | Status: SHIPPED | OUTPATIENT
Start: 2023-02-06 | End: 2023-05-07

## 2023-02-06 SDOH — ECONOMIC STABILITY: HOUSING INSECURITY
IN THE LAST 12 MONTHS, WAS THERE A TIME WHEN YOU DID NOT HAVE A STEADY PLACE TO SLEEP OR SLEPT IN A SHELTER (INCLUDING NOW)?: NO

## 2023-02-06 SDOH — ECONOMIC STABILITY: FOOD INSECURITY: WITHIN THE PAST 12 MONTHS, THE FOOD YOU BOUGHT JUST DIDN'T LAST AND YOU DIDN'T HAVE MONEY TO GET MORE.: NEVER TRUE

## 2023-02-06 SDOH — ECONOMIC STABILITY: FOOD INSECURITY: WITHIN THE PAST 12 MONTHS, YOU WORRIED THAT YOUR FOOD WOULD RUN OUT BEFORE YOU GOT MONEY TO BUY MORE.: NEVER TRUE

## 2023-02-06 SDOH — ECONOMIC STABILITY: INCOME INSECURITY: HOW HARD IS IT FOR YOU TO PAY FOR THE VERY BASICS LIKE FOOD, HOUSING, MEDICAL CARE, AND HEATING?: PATIENT DECLINED

## 2023-02-06 ASSESSMENT — PATIENT HEALTH QUESTIONNAIRE - PHQ9
SUM OF ALL RESPONSES TO PHQ QUESTIONS 1-9: 0
2. FEELING DOWN, DEPRESSED OR HOPELESS: 0
1. LITTLE INTEREST OR PLEASURE IN DOING THINGS: 0
SUM OF ALL RESPONSES TO PHQ QUESTIONS 1-9: 0
SUM OF ALL RESPONSES TO PHQ9 QUESTIONS 1 & 2: 0

## 2023-02-06 NOTE — PROGRESS NOTES
Aleena Llamas is a 63 y.o. female here for routine follow up of diabetes. She has been checking  her blood glucose levels regularly. She denies numbness and tingling in the hands and feet. She has been compliant with diet and exercise. She has been compliant with her medications. She is tolerating medications. Hypertension: Patient here for follow-up of elevated blood pressure. She is not exercising and is adherent to low salt diet. Blood pressure is well controlled at home. Cardiac symptoms none. Patient denies chest pain, dyspnea, fatigue, and palpitations. Cardiovascular risk factors: diabetes mellitus and hypertension. Use of agents associated with hypertension: none. Hyperlipidemia: Patient presents with hyperlipidemia. There is a family history of hyperlipidemia. Patient reports headache and sinus pressure x 1 week. Denies cough, congestion, fever, chills, malaise.         Allergies:  Hydrocodone-acetaminophen, Penicillin g, and Codeine    Past Medical History:    Past Medical History:   Diagnosis Date    Obesity        Past Surgical History:    Past Surgical History:   Procedure Laterality Date    BREAST BIOPSY         Social History:   Social History     Tobacco Use    Smoking status: Former     Packs/day: 2.00     Years: 25.00     Pack years: 50.00     Types: Cigarettes     Quit date: 10/1/2018     Years since quittin.3    Smokeless tobacco: Never   Substance Use Topics    Alcohol use: Not on file       Family History:   Family History   Problem Relation Age of Onset    Coronary Art Dis Father     Breast Cancer Paternal Grandmother          Review of Systems:  Constitutional: negative for fever or chills, has headache  Eyes: negative for visual disturbance   ENT: positive for sore throat , nasal congestion  Respiratory: negative for cough, shortness of breath and sputum  Cardiovascular: negative for chest pain,pnd,claudication or palpitations  Gastrointestinal: negative for abd pain, lucy,nausea, vomiting, diarrhea or constipation  Genitourinary: negative for dysuria,,hematuria urgency or frequency  Musculoskeletal:negative for arthralgias, muscle weakness and stiff joints   Integument/breast: negative for skin rash or lesions  Neurological: negative for   numbness and tingling. Psych: negative for anxiety, depression, suicidial ideation and suicidal attempt. Objective:  Physical Exam:  /82 (Site: Right Upper Arm, Position: Sitting, Cuff Size: Large Adult)   Pulse 85   Resp 18   Ht 5' 2\" (1.575 m)   Wt 232 lb 6.4 oz (105.4 kg)   SpO2 98%   BMI 42.51 kg/m²   GEN:   She is alert and oriented  ENT:   Has mild congestion of nose and throat,has bilat serous effusion  NECK:   neck supple and non tender without mass, no thyromegaly or thyroid nodules, no cervical lymphadenopathy  EYES:   No gross abnormalities. CVS:     CVS exam BP noted to be well controlled today in office, S1, S2 normal, no gallop, no murmur, chest clear, no JVD, no HSM, no edema  PULM:   chest clear, no wheezing, rales, normal symmetric air entry  ABD:   exam deferred  MUSC: no joint tenderness, deformity or swelling  Skin : no  rash or lesions  EXT: {EXTREMITIES EXAM:82830::\"Extremities: + 2 pedal pulses, no edema or calf tenderness, and warm to touch. FEET:  no open sores are present bilaterally  NEURO: monofilament normal bilaterally DTR -normal        Diagnostic Data:  Lab Results   Component Value Date    GLUCOSE 131 (H) 08/02/2021    LABA1C 7.1 02/06/2023    BUN 14 08/02/2021     08/02/2021    K 4.2 08/02/2021    CALCIUM 8.9 08/02/2021     08/02/2021    CO2 24 08/02/2021       Assessment:  1. Type 2 diabetes mellitus without complication, without long-term current use of insulin ([address]    2. Hypertension, unspecified type [I10 (ICD-10-CM)]      Plan   Diagnosis Orders   1.  Type 2 diabetes mellitus without complication, without long-term current use of insulin ([address] POCT glycosylated hemoglobin (Hb A1C)      2. Hypertension, unspecified type [I10 (ICD-10-CM)]            Check BS 1 times per day  Encouraged low fat, low sodium, and diabetic, 1800 calorie diet. Goal for blood pressure control is 130/80  Recommended regular exercise as tolerated, 5 times per week  Labs reviewed hba1c 7.1  Advised to complete cologuard  ascivd risk  Orders Placed This Encounter   Procedures    POCT glycosylated hemoglobin (Hb A1C)       Current Outpatient Medications   Medication Sig Dispense Refill    rosuvastatin (CRESTOR) 5 MG tablet Take 1 tablet by mouth nightly 90 tablet 2    sertraline (ZOLOFT) 50 MG tablet Take 1 tablet by mouth daily 90 tablet 0    losartan (COZAAR) 25 MG tablet Take one Tablet by mouth daily 90 tablet 0    Empagliflozin-metFORMIN HCl (SYNJARDY) 12.5-500 MG TABS Take 1 tablet by mouth in the morning and at bedtime 180 tablet 0    Glucos-Chond-Hyal Ac-Ca Fructo (MOVE FREE JOINT HEALTH ADVANCE PO) Take by mouth daily      ACETAMINOPHEN PO Take by mouth as needed (Patient not taking: Reported on 2/6/2023)       No current facility-administered medications for this visit. No follow-ups on file.       Electronically signed by China Erwin MD on 2/6/2023 at 9:01 AM

## 2023-02-06 NOTE — PATIENT INSTRUCTIONS
SURVEY:    You may be receiving a survey from [employer] regarding your visit today. You may get this in the mail, through your MyChart, or in your email. Please complete the survey to enable us to provide the highest quality of care to you and your family. If you cannot score us a very good (5 Stars) on any question, please call the office to discuss how we could of made your experience exceptional.    Thank you!     Dr. Tanvi Rowell, LPMARILOU Hutchins, JESSICA Anthony, [address] [place] [address]    Phone: [phone]  Fax: [phone]    Office Hours:   Debora Dodson, [address], F: 8-5

## 2023-02-08 RX ORDER — AZITHROMYCIN 250 MG/1
250 TABLET, FILM COATED ORAL SEE ADMIN INSTRUCTIONS
Qty: 6 TABLET | Refills: 0 | Status: SHIPPED | OUTPATIENT
Start: 2023-02-08 | End: 2023-02-13

## 2023-03-06 RX ORDER — EMPAGLIFLOZIN AND METFORMIN HYDROCHLORIDE 12.5; 5 MG/1; MG/1
1 TABLET ORAL 2 TIMES DAILY
Qty: 180 TABLET | Refills: 0 | Status: SHIPPED | OUTPATIENT
Start: 2023-03-06 | End: 2023-06-04

## 2023-03-06 RX ORDER — LOSARTAN POTASSIUM 25 MG/1
TABLET ORAL
Qty: 90 TABLET | Refills: 0 | Status: SHIPPED | OUTPATIENT
Start: 2023-03-06

## 2023-05-22 RX ORDER — EMPAGLIFLOZIN AND METFORMIN HYDROCHLORIDE 12.5; 5 MG/1; MG/1
1 TABLET ORAL 2 TIMES DAILY
Qty: 180 TABLET | Refills: 0 | Status: SHIPPED | OUTPATIENT
Start: 2023-05-22 | End: 2023-08-20

## 2023-05-22 RX ORDER — ROSUVASTATIN CALCIUM 5 MG/1
5 TABLET, COATED ORAL NIGHTLY
Qty: 90 TABLET | Refills: 0 | Status: SHIPPED | OUTPATIENT
Start: 2023-05-22 | End: 2023-08-20

## 2023-06-12 RX ORDER — LOSARTAN POTASSIUM 25 MG/1
TABLET ORAL
Qty: 30 TABLET | Refills: 0 | Status: SHIPPED | OUTPATIENT
Start: 2023-06-12 | End: 2023-06-24 | Stop reason: SDUPTHER

## 2023-06-26 RX ORDER — LOSARTAN POTASSIUM 25 MG/1
TABLET ORAL
Qty: 30 TABLET | Refills: 0 | Status: SHIPPED | OUTPATIENT
Start: 2023-06-26 | End: 2023-08-07 | Stop reason: SDUPTHER

## 2023-08-07 RX ORDER — LOSARTAN POTASSIUM 25 MG/1
TABLET ORAL
Qty: 90 TABLET | Refills: 0 | Status: SHIPPED | OUTPATIENT
Start: 2023-08-07 | End: 2023-08-08 | Stop reason: SDUPTHER

## 2023-08-08 ENCOUNTER — OFFICE VISIT (OUTPATIENT)
Dept: PRIMARY CARE CLINIC | Age: 63
End: 2023-08-08
Payer: COMMERCIAL

## 2023-08-08 VITALS
DIASTOLIC BLOOD PRESSURE: 82 MMHG | OXYGEN SATURATION: 100 % | RESPIRATION RATE: 18 BRPM | BODY MASS INDEX: 41.81 KG/M2 | WEIGHT: 228.6 LBS | HEART RATE: 98 BPM | SYSTOLIC BLOOD PRESSURE: 126 MMHG

## 2023-08-08 DIAGNOSIS — R19.5 POSITIVE FIT (FECAL IMMUNOCHEMICAL TEST): ICD-10-CM

## 2023-08-08 DIAGNOSIS — E78.5 DYSLIPIDEMIA: ICD-10-CM

## 2023-08-08 DIAGNOSIS — E11.9 TYPE 2 DIABETES MELLITUS WITHOUT COMPLICATION, WITHOUT LONG-TERM CURRENT USE OF INSULIN (HCC): Primary | ICD-10-CM

## 2023-08-08 DIAGNOSIS — I10 HYPERTENSION, UNSPECIFIED TYPE: ICD-10-CM

## 2023-08-08 PROCEDURE — 3079F DIAST BP 80-89 MM HG: CPT | Performed by: STUDENT IN AN ORGANIZED HEALTH CARE EDUCATION/TRAINING PROGRAM

## 2023-08-08 PROCEDURE — 3074F SYST BP LT 130 MM HG: CPT | Performed by: STUDENT IN AN ORGANIZED HEALTH CARE EDUCATION/TRAINING PROGRAM

## 2023-08-08 PROCEDURE — 99214 OFFICE O/P EST MOD 30 MIN: CPT | Performed by: STUDENT IN AN ORGANIZED HEALTH CARE EDUCATION/TRAINING PROGRAM

## 2023-08-08 PROCEDURE — 3051F HG A1C>EQUAL 7.0%<8.0%: CPT | Performed by: STUDENT IN AN ORGANIZED HEALTH CARE EDUCATION/TRAINING PROGRAM

## 2023-08-08 RX ORDER — LOSARTAN POTASSIUM 25 MG/1
TABLET ORAL
Qty: 90 TABLET | Refills: 0 | Status: SHIPPED | OUTPATIENT
Start: 2023-08-08

## 2023-08-08 NOTE — PATIENT INSTRUCTIONS
SURVEY:    You may be receiving a survey from [employer] regarding your visit today. Please complete the survey to enable us to provide the highest quality of care to you and your family. If you cannot score us a very good on any question, please call the office to discuss how we could of made your experience a very good one. Thank you.     Dr. Romi KULKARNI-CNP  Check-In Medical Assistant: Patrick Black Assistant: Zoya Cordero rooming Medical Assistant/s: Marlene Maldonado rooming Medical Assistant: Baron Nkechi Potts Assistant: [id number] [place] [address] Road Assistant: Sarah  Triage Medical Assistant: Mau Floyd

## 2023-08-08 NOTE — PROGRESS NOTES
[address], [place] [address] [place], [place], [address] VILLEDA is a 63 y.o. female with  has a past medical history of Obesity. Presented to the office today for:  Chief Complaint   Patient presents with    Diabetes    Hypertension    Established New Doctor       Assessment/Plan   1. Type 2 diabetes mellitus without complication, without long-term current use of insulin (HCC)  -     CBC with Auto Differential; Future  -     Comprehensive Metabolic Panel; Future  -     Hemoglobin A1C; Future  -     Microalbumin / Creatinine Urine Ratio; Future  -     Tirzepatide (MOUNJARO) 2.5 MG/0.5ML SOPN SC injection; Inject 0.5 mLs into the skin once a week, Disp-4 Adjustable Dose Pre-filled Pen Syringe, R-1Normal  -     Continuous Blood Gluc Sensor (FREESTYLE GERRI 2 SENSOR) MISC; 1 each by Does not apply route every 14 days, Disp-1 each, R-5Normal  -     Continuous Blood Gluc  (FREESTYLE GERRI 2 READER) PAULA; 1 each by Does not apply route daily, Disp-1 each, R-3Normal  2. Hypertension, unspecified type [I10 (ICD-10-CM)]  -     CBC with Auto Differential; Future  -     Comprehensive Metabolic Panel; Future  -     TSH With Reflex Ft4; Future  3. Dyslipidemia  -     Lipid Panel; Future  4. Positive FIT (fecal immunochemical test)  -     [address] [facility]  Return in about 1 month (around 9/8/2023) for F/U Meds/Labs. T2DM -not at goal, continue w/ Synjardy and add Mounjaro today. RBA discussed today; start monitoring glucose at home  HTN - stable and at goal, cont w/ losartan  Hyperlipidemia - stable and at goal, continue w/ statin at the current dose  Colonoscopy discussed, RBA discussed   F/U with Workman's comp re: Shoulder     All patient questions answered. Pt voiced understanding.    Medications Discontinued During This Encounter   Medication Reason    ACETAMINOPHEN PO LIST CLEANUP    losartan (COZAAR) 25 MG tablet REORDER       Patient received

## 2023-08-09 ENCOUNTER — TELEPHONE (OUTPATIENT)
Dept: PRIMARY CARE CLINIC | Age: 63
End: 2023-08-09

## 2023-08-09 DIAGNOSIS — E11.9 TYPE 2 DIABETES MELLITUS WITHOUT COMPLICATION, WITHOUT LONG-TERM CURRENT USE OF INSULIN (HCC): Primary | ICD-10-CM

## 2023-08-09 RX ORDER — ORAL SEMAGLUTIDE 7 MG/1
7 TABLET ORAL DAILY
Qty: 30 TABLET | Refills: 0 | Status: SHIPPED | OUTPATIENT
Start: 2023-09-09

## 2023-08-09 RX ORDER — ORAL SEMAGLUTIDE 3 MG/1
3 TABLET ORAL DAILY
Qty: 30 TABLET | Refills: 0 | Status: SHIPPED | OUTPATIENT
Start: 2023-08-09

## 2023-08-09 NOTE — TELEPHONE ENCOUNTER
Spoke with patient about her insurance not covering Mounjaro until she has tried another preferred medication, patient said she is fine with going off of your recommendation. Insurance preferred medications are Ozempic, Rybelsus, Trulicity, or Victoza. Please advise which medication you think would be best for patient.

## 2023-08-21 RX ORDER — EMPAGLIFLOZIN AND METFORMIN HYDROCHLORIDE 12.5; 5 MG/1; MG/1
1 TABLET ORAL 2 TIMES DAILY
Qty: 180 TABLET | Refills: 0 | Status: SHIPPED | OUTPATIENT
Start: 2023-08-21 | End: 2023-11-19

## 2023-08-21 RX ORDER — ROSUVASTATIN CALCIUM 5 MG/1
5 TABLET, COATED ORAL NIGHTLY
Qty: 90 TABLET | Refills: 0 | Status: SHIPPED | OUTPATIENT
Start: 2023-08-21 | End: 2023-11-19

## 2023-08-29 LAB
ABSOLUTE BASO #: 0.05 K/UL (ref 0–0.2)
ABSOLUTE EOS #: 0.15 K/UL (ref 0–0.5)
ABSOLUTE LYMPH #: 2.51 K/UL (ref 1–4)
ABSOLUTE MONO #: 0.35 K/UL (ref 0.2–1)
ABSOLUTE NEUT #: 2.86 K/UL (ref 1.5–7.5)
ALBUMIN SERPL-MCNC: 4.5 G/DL (ref 3.5–5.2)
ALK PHOSPHATASE: 90 U/L (ref 40–136)
ALT SERPL-CCNC: 69 U/L (ref 5–40)
ANION GAP SERPL CALCULATED.3IONS-SCNC: 13 MEQ/L (ref 7–16)
AST SERPL-CCNC: 74 U/L (ref 9–40)
AVERAGE GLUCOSE: 177 MG/DL
BASOPHILS RELATIVE PERCENT: 0.8 %
BILIRUB SERPL-MCNC: 0.6 MG/DL
BUN BLDV-MCNC: 11 MG/DL (ref 8–23)
CALCIUM SERPL-MCNC: 9.5 MG/DL (ref 8.5–10.5)
CHLORIDE BLD-SCNC: 102 MEQ/L (ref 95–107)
CHOLESTEROL/HDL RATIO: 2.8 RATIO
CHOLESTEROL: 142 MG/DL
CO2: 25 MEQ/L (ref 19–31)
CREAT SERPL-MCNC: 0.56 MG/DL (ref 0.6–1.3)
CREATINE, URINE: 44 MG/DL
EGFR IF NONAFRICAN AMERICAN: 104 ML/MIN/1.73
EOSINOPHILS RELATIVE PERCENT: 2.5 %
GLUCOSE: 145 MG/DL (ref 70–99)
HBA1C MFR BLD: 7.8 % (ref 4.2–5.6)
HCT VFR BLD CALC: 46.1 % (ref 34–45)
HDLC SERPL-MCNC: 51 MG/DL
HEMOGLOBIN: 16.1 G/DL (ref 11.5–15.5)
LDL CHOLESTEROL CALCULATED: 58 MG/DL
LDL/HDL RATIO: 1.1 RATIO
LYMPHOCYTE %: 42.4 %
MCH RBC QN AUTO: 30.1 PG (ref 25–33)
MCHC RBC AUTO-ENTMCNC: 34.9 G/DL (ref 31–36)
MCV RBC AUTO: 86.3 FL (ref 80–99)
MICROALBUMIN/CREAT 24H UR: 1.2 MG/DL
MICROALBUMIN/CREAT UR-RTO: 27 MG/G
MONOCYTES # BLD: 5.9 %
NEUTROPHILS RELATIVE PERCENT: 48.4 %
PDW BLD-RTO: 14.4 % (ref 11.5–15)
PLATELETS: 154 K/UL (ref 130–400)
PMV BLD AUTO: 11.3 FL (ref 9.3–13)
POTASSIUM SERPL-SCNC: 4.6 MEQ/L (ref 3.5–5.4)
RBC: 5.34 M/UL (ref 3.8–5.4)
SODIUM BLD-SCNC: 140 MEQ/L (ref 133–146)
TOTAL PROTEIN: 7.4 G/DL (ref 6.1–8.3)
TRIGL SERPL-MCNC: 163 MG/DL
TSH SERPL DL<=0.05 MIU/L-ACNC: 0.52 UIU/ML (ref 0.4–4.1)
VLDLC SERPL CALC-MCNC: 33 MG/DL
WBC: 5.9 K/UL (ref 3.5–11)

## 2023-09-07 ENCOUNTER — OFFICE VISIT (OUTPATIENT)
Dept: PRIMARY CARE CLINIC | Age: 63
End: 2023-09-07
Payer: COMMERCIAL

## 2023-09-07 VITALS
HEART RATE: 88 BPM | DIASTOLIC BLOOD PRESSURE: 82 MMHG | WEIGHT: 228 LBS | HEIGHT: 62 IN | RESPIRATION RATE: 18 BRPM | SYSTOLIC BLOOD PRESSURE: 122 MMHG | BODY MASS INDEX: 41.96 KG/M2

## 2023-09-07 DIAGNOSIS — E78.5 DYSLIPIDEMIA: ICD-10-CM

## 2023-09-07 DIAGNOSIS — I10 HYPERTENSION, UNSPECIFIED TYPE: ICD-10-CM

## 2023-09-07 DIAGNOSIS — E11.9 TYPE 2 DIABETES MELLITUS WITHOUT COMPLICATION, WITHOUT LONG-TERM CURRENT USE OF INSULIN (HCC): Primary | ICD-10-CM

## 2023-09-07 PROCEDURE — 99214 OFFICE O/P EST MOD 30 MIN: CPT | Performed by: STUDENT IN AN ORGANIZED HEALTH CARE EDUCATION/TRAINING PROGRAM

## 2023-09-07 PROCEDURE — 3051F HG A1C>EQUAL 7.0%<8.0%: CPT | Performed by: STUDENT IN AN ORGANIZED HEALTH CARE EDUCATION/TRAINING PROGRAM

## 2023-09-07 PROCEDURE — 3079F DIAST BP 80-89 MM HG: CPT | Performed by: STUDENT IN AN ORGANIZED HEALTH CARE EDUCATION/TRAINING PROGRAM

## 2023-09-07 PROCEDURE — 3074F SYST BP LT 130 MM HG: CPT | Performed by: STUDENT IN AN ORGANIZED HEALTH CARE EDUCATION/TRAINING PROGRAM

## 2023-09-07 RX ORDER — ORAL SEMAGLUTIDE 7 MG/1
7 TABLET ORAL DAILY
Qty: 90 TABLET | Refills: 0 | Status: SHIPPED | OUTPATIENT
Start: 2023-09-09

## 2023-09-07 NOTE — PROGRESS NOTES
[address], [address] [place], [place], [address] Chana VILLEDA is a 63 y.o. female with  has a past medical history of Obesity. Presented to the office today for:  Chief Complaint   Patient presents with    Diabetes    Hypertension       Assessment/Plan   1. Type 2 diabetes mellitus without complication, without long-term current use of insulin (HCC)  -     Semaglutide (RYBELSUS) 7 MG TABS; Take 7 mg by mouth daily To be started after day 31 - 90 day supply/self-pay, Disp-90 tablet, R-0Normal  2. Hypertension, unspecified type [I10 (ICD-10-CM)]  3. Dyslipidemia  Return in about 3 months (around 12/7/2023) for F/U T2DM/HTN. T2DM - continue w/ Synjardy and Rybelsus 3mg for now, increase 7mg. Continue to monitor glucose at home and diet/exercise. HTN - stable and at goal, cont w/ losartan  Hyperlipidemia - stable and at goal, continue w/ statin at the current dose     All patient questions answered. Pt voiced understanding. Medications Discontinued During This Encounter   Medication Reason    Semaglutide (RYBELSUS) 7 MG TABS REORDER       Patient received counseling on the following healthy behaviors: nutrition, exercise and medication adherence. I encouraged and discussed lifestyle modifications including diet and exercise and the patient was agreeable to making positive/beneficial changes to both to help improve their overall health. Discussed use, benefit, and side effects of prescribed medications. Barriers to medication compliance addressed. Patient given educational materials: see patient instructions. HM - HM items completed today as per orders. Outstanding HM items though not limited to immunizations were discussed with the patient today, including risks, benefits and alternatives. The patient will discuss these during the next appointment per their preference.  If there are any worsening or concerning signs or symptoms, patient will report to the ED

## 2023-11-03 RX ORDER — LOSARTAN POTASSIUM 25 MG/1
TABLET ORAL
Qty: 90 TABLET | Refills: 0 | Status: SHIPPED | OUTPATIENT
Start: 2023-11-03

## 2023-11-20 RX ORDER — EMPAGLIFLOZIN AND METFORMIN HYDROCHLORIDE 12.5; 5 MG/1; MG/1
1 TABLET ORAL 2 TIMES DAILY
Qty: 180 TABLET | Refills: 0 | Status: SHIPPED | OUTPATIENT
Start: 2023-11-20 | End: 2024-02-18

## 2023-11-20 RX ORDER — ROSUVASTATIN CALCIUM 5 MG/1
5 TABLET, COATED ORAL NIGHTLY
Qty: 90 TABLET | Refills: 0 | Status: SHIPPED | OUTPATIENT
Start: 2023-11-20 | End: 2024-02-18

## 2023-12-05 DIAGNOSIS — E11.9 TYPE 2 DIABETES MELLITUS WITHOUT COMPLICATION, WITHOUT LONG-TERM CURRENT USE OF INSULIN (HCC): ICD-10-CM

## 2023-12-05 RX ORDER — ORAL SEMAGLUTIDE 7 MG/1
7 TABLET ORAL DAILY
Qty: 90 TABLET | Refills: 0 | Status: SHIPPED | OUTPATIENT
Start: 2023-12-05

## 2023-12-08 ENCOUNTER — TELEPHONE (OUTPATIENT)
Dept: PRIMARY CARE CLINIC | Age: 63
End: 2023-12-08

## 2023-12-08 ENCOUNTER — OFFICE VISIT (OUTPATIENT)
Dept: PRIMARY CARE CLINIC | Age: 63
End: 2023-12-08
Payer: COMMERCIAL

## 2023-12-08 VITALS
WEIGHT: 228 LBS | DIASTOLIC BLOOD PRESSURE: 68 MMHG | OXYGEN SATURATION: 97 % | HEART RATE: 67 BPM | SYSTOLIC BLOOD PRESSURE: 124 MMHG | HEIGHT: 62 IN | BODY MASS INDEX: 41.96 KG/M2

## 2023-12-08 DIAGNOSIS — R21 RASH: ICD-10-CM

## 2023-12-08 DIAGNOSIS — E11.9 TYPE 2 DIABETES MELLITUS WITHOUT COMPLICATION, WITHOUT LONG-TERM CURRENT USE OF INSULIN (HCC): Primary | ICD-10-CM

## 2023-12-08 DIAGNOSIS — E78.5 DYSLIPIDEMIA: ICD-10-CM

## 2023-12-08 DIAGNOSIS — I10 HYPERTENSION, UNSPECIFIED TYPE: ICD-10-CM

## 2023-12-08 LAB — HBA1C MFR BLD: 7 %

## 2023-12-08 PROCEDURE — 3078F DIAST BP <80 MM HG: CPT | Performed by: STUDENT IN AN ORGANIZED HEALTH CARE EDUCATION/TRAINING PROGRAM

## 2023-12-08 PROCEDURE — 3074F SYST BP LT 130 MM HG: CPT | Performed by: STUDENT IN AN ORGANIZED HEALTH CARE EDUCATION/TRAINING PROGRAM

## 2023-12-08 PROCEDURE — 3051F HG A1C>EQUAL 7.0%<8.0%: CPT | Performed by: STUDENT IN AN ORGANIZED HEALTH CARE EDUCATION/TRAINING PROGRAM

## 2023-12-08 PROCEDURE — 83036 HEMOGLOBIN GLYCOSYLATED A1C: CPT | Performed by: STUDENT IN AN ORGANIZED HEALTH CARE EDUCATION/TRAINING PROGRAM

## 2023-12-08 PROCEDURE — 99214 OFFICE O/P EST MOD 30 MIN: CPT | Performed by: STUDENT IN AN ORGANIZED HEALTH CARE EDUCATION/TRAINING PROGRAM

## 2023-12-08 RX ORDER — NYSTATIN 100000 U/G
OINTMENT TOPICAL 3 TIMES DAILY
Qty: 30 G | Refills: 0 | Status: SHIPPED | OUTPATIENT
Start: 2023-12-08 | End: 2023-12-08 | Stop reason: SDUPTHER

## 2023-12-08 RX ORDER — NYSTATIN 100000 U/G
OINTMENT TOPICAL 3 TIMES DAILY
Qty: 30 G | Refills: 0 | Status: SHIPPED | OUTPATIENT
Start: 2023-12-08

## 2023-12-08 NOTE — PROGRESS NOTES
[address], [place] [address] [place], [place], [address] Chana VILLEDA is a 63 y.o. female with  has a past medical history of Obesity. Presented to the office today for:  Chief Complaint   Patient presents with    Diabetes    Hypertension    Hyperlipidemia       Assessment/Plan   1. Type 2 diabetes mellitus without complication, without long-term current use of insulin (HCC)  -     POCT glycosylated hemoglobin (Hb A1C)  2. Hypertension, unspecified type [I10 (ICD-10-CM)]  3. Dyslipidemia  4. Rash  -     nystatin (MYCOSTATIN) [id number] UNIT/GM ointment; Apply topically 3 times daily, Topical, 3 TIMES DAILY Starting Fri 12/8/2023, Disp-30 g, R-0, Normal  Return in about 3 months (around 3/8/2024) for F/U Meds. T2DM - continue w/ Synjardy and Rybelsus (which could be increased next to 14mg)  Continue to monitor glucose at home and diet/exercise to be persistent. HTN - stable and at goal, cont w/ losartan  Hyperlipidemia - stable and at goal, continue w/ statin at the current dose  Rash  - intertriginous rash/suspected to be fungal. Start nystatin and if symptoms do not resolve then PO antifungal.     All patient questions answered. Pt voiced understanding. Medications Discontinued During This Encounter   Medication Reason    Semaglutide (RYBELSUS) 3 MG TABS LIST CLEANUP    nystatin (MYCOSTATIN) [id number] UNIT/GM ointment REORDER       Patient received counseling on the following healthy behaviors: nutrition, exercise and medication adherence. I encouraged and discussed lifestyle modifications including diet and exercise and the patient was agreeable to making positive/beneficial changes to both to help improve their overall health. Discussed use, benefit, and side effects of prescribed medications. Barriers to medication compliance addressed. Patient given educational materials: see patient instructions. HM - HM items completed today as per orders.  Outstanding HM items

## 2023-12-08 NOTE — TELEPHONE ENCOUNTER
Zaida calling needing clarification on Nystatin Rx. Should patient be using twice or three times daily?

## 2023-12-28 LAB — DIABETIC RETINOPATHY: NEGATIVE

## 2024-01-29 RX ORDER — LOSARTAN POTASSIUM 25 MG/1
TABLET ORAL
Qty: 90 TABLET | Refills: 0 | Status: SHIPPED | OUTPATIENT
Start: 2024-01-29

## 2024-02-02 ENCOUNTER — TELEPHONE (OUTPATIENT)
Dept: GASTROENTEROLOGY | Age: 64
End: 2024-02-02

## 2024-02-06 ENCOUNTER — TELEMEDICINE (OUTPATIENT)
Dept: PRIMARY CARE CLINIC | Age: 64
End: 2024-02-06
Payer: COMMERCIAL

## 2024-02-06 DIAGNOSIS — R06.2 WHEEZING: ICD-10-CM

## 2024-02-06 DIAGNOSIS — R09.81 NASAL CONGESTION: ICD-10-CM

## 2024-02-06 DIAGNOSIS — J01.00 ACUTE MAXILLARY SINUSITIS, RECURRENCE NOT SPECIFIED: ICD-10-CM

## 2024-02-06 DIAGNOSIS — R53.83 FATIGUE, UNSPECIFIED TYPE: ICD-10-CM

## 2024-02-06 DIAGNOSIS — R05.1 ACUTE COUGH: Primary | ICD-10-CM

## 2024-02-06 PROCEDURE — 99213 OFFICE O/P EST LOW 20 MIN: CPT | Performed by: NURSE PRACTITIONER

## 2024-02-06 RX ORDER — BENZONATATE 200 MG/1
200 CAPSULE ORAL 3 TIMES DAILY PRN
Qty: 30 CAPSULE | Refills: 0 | Status: SHIPPED | OUTPATIENT
Start: 2024-02-06 | End: 2024-02-16

## 2024-02-06 RX ORDER — DEXTROMETHORPHAN HYDROBROMIDE AND PROMETHAZINE HYDROCHLORIDE 15; 6.25 MG/5ML; MG/5ML
5 SYRUP ORAL 4 TIMES DAILY PRN
Qty: 120 ML | Refills: 0 | Status: SHIPPED | OUTPATIENT
Start: 2024-02-06

## 2024-02-06 ASSESSMENT — ENCOUNTER SYMPTOMS
SINUS PAIN: 1
WHEEZING: 1
SINUS PRESSURE: 1
SHORTNESS OF BREATH: 0
COUGH: 1

## 2024-02-06 NOTE — PROGRESS NOTES
Phyllis Black, was evaluated through a synchronous (real-time) audio-video encounter. The patient (or guardian if applicable) is aware that this is a billable service, which includes applicable co-pays. This Virtual Visit was conducted with patient's (and/or legal guardian's) consent. Patient identification was verified, and a caregiver was present when appropriate.   The patient was located at Other: In parked car in [place] in parking lot  Provider was located at Home (Appt Dept State): LORETO Black (:  1960) is a Established patient, presenting virtually for evaluation of the following:  Symptoms started x2 weeks ago, pt states that she started to feel better and then the end of last week symptoms returned. Pt c/o deep cough with clear/white mucus production, wheezing when laying down, and a lot of pressure across forehead/nose, clear mucus when blowing nose.  Assessment & Plan   Below is the assessment and plan developed based on review of pertinent history, physical exam, labs, studies, and medications.  1. Acute cough  Problem  -     promethazine-dextromethorphan (PROMETHAZINE-DM) 6.25-15 MG/5ML syrup; Take 5 mLs by mouth 4 times daily as needed for Cough, Disp-120 mL, R-0Normal  -     benzonatate (TESSALON) 200 MG capsule; Take 1 capsule by mouth 3 times daily as needed for Cough, Disp-30 capsule, R-0Normal  She was instructed to alternate between the above 2 medications approximately 4 to 5 hours from taking each other.  She was instructed to not take these together.  See patient instructions    A cough is your body's response to something that bothers your throat or airways. Many things can cause a cough. You might cough because of a cold or the flu, bronchitis, or asthma. Smoking, postnasal drip, allergies, and stomach acid that backs up into your throat also can cause coughs.  A cough is a symptom, not a disease. Most coughs stop when the cause, such as a cold, goes away. You can take a

## 2024-02-19 RX ORDER — EMPAGLIFLOZIN AND METFORMIN HYDROCHLORIDE 12.5; 5 MG/1; MG/1
1 TABLET ORAL 2 TIMES DAILY
Qty: 180 TABLET | Refills: 0 | Status: SHIPPED | OUTPATIENT
Start: 2024-02-19 | End: 2024-05-19

## 2024-02-19 RX ORDER — ROSUVASTATIN CALCIUM 5 MG/1
5 TABLET, COATED ORAL NIGHTLY
Qty: 90 TABLET | Refills: 0 | Status: SHIPPED | OUTPATIENT
Start: 2024-02-19 | End: 2024-05-19

## 2024-03-12 ENCOUNTER — OFFICE VISIT (OUTPATIENT)
Dept: PRIMARY CARE CLINIC | Age: 64
End: 2024-03-12
Payer: COMMERCIAL

## 2024-03-12 VITALS
HEIGHT: 62 IN | DIASTOLIC BLOOD PRESSURE: 72 MMHG | SYSTOLIC BLOOD PRESSURE: 122 MMHG | OXYGEN SATURATION: 96 % | WEIGHT: 222 LBS | HEART RATE: 92 BPM | BODY MASS INDEX: 40.85 KG/M2

## 2024-03-12 DIAGNOSIS — I10 HYPERTENSION, UNSPECIFIED TYPE: ICD-10-CM

## 2024-03-12 DIAGNOSIS — J40 BRONCHITIS: ICD-10-CM

## 2024-03-12 DIAGNOSIS — E78.5 DYSLIPIDEMIA: ICD-10-CM

## 2024-03-12 DIAGNOSIS — E11.9 TYPE 2 DIABETES MELLITUS WITHOUT COMPLICATION, WITHOUT LONG-TERM CURRENT USE OF INSULIN (HCC): Primary | ICD-10-CM

## 2024-03-12 PROCEDURE — 3078F DIAST BP <80 MM HG: CPT | Performed by: STUDENT IN AN ORGANIZED HEALTH CARE EDUCATION/TRAINING PROGRAM

## 2024-03-12 PROCEDURE — 3074F SYST BP LT 130 MM HG: CPT | Performed by: STUDENT IN AN ORGANIZED HEALTH CARE EDUCATION/TRAINING PROGRAM

## 2024-03-12 PROCEDURE — G2211 COMPLEX E/M VISIT ADD ON: HCPCS | Performed by: STUDENT IN AN ORGANIZED HEALTH CARE EDUCATION/TRAINING PROGRAM

## 2024-03-12 PROCEDURE — 99214 OFFICE O/P EST MOD 30 MIN: CPT | Performed by: STUDENT IN AN ORGANIZED HEALTH CARE EDUCATION/TRAINING PROGRAM

## 2024-03-12 RX ORDER — ORAL SEMAGLUTIDE 14 MG/1
14 TABLET ORAL DAILY
Qty: 90 TABLET | Refills: 0 | Status: SHIPPED | OUTPATIENT
Start: 2024-03-12

## 2024-03-12 RX ORDER — AZITHROMYCIN 250 MG/1
TABLET, FILM COATED ORAL
Qty: 6 TABLET | Refills: 0 | Status: SHIPPED | OUTPATIENT
Start: 2024-03-12 | End: 2024-03-22

## 2024-03-12 SDOH — ECONOMIC STABILITY: INCOME INSECURITY: HOW HARD IS IT FOR YOU TO PAY FOR THE VERY BASICS LIKE FOOD, HOUSING, MEDICAL CARE, AND HEATING?: NOT HARD AT ALL

## 2024-03-12 SDOH — ECONOMIC STABILITY: FOOD INSECURITY: WITHIN THE PAST 12 MONTHS, YOU WORRIED THAT YOUR FOOD WOULD RUN OUT BEFORE YOU GOT MONEY TO BUY MORE.: NEVER TRUE

## 2024-03-12 SDOH — ECONOMIC STABILITY: FOOD INSECURITY: WITHIN THE PAST 12 MONTHS, THE FOOD YOU BOUGHT JUST DIDN'T LAST AND YOU DIDN'T HAVE MONEY TO GET MORE.: NEVER TRUE

## 2024-03-12 ASSESSMENT — PATIENT HEALTH QUESTIONNAIRE - PHQ9
1. LITTLE INTEREST OR PLEASURE IN DOING THINGS: 0
SUM OF ALL RESPONSES TO PHQ QUESTIONS 1-9: 0
2. FEELING DOWN, DEPRESSED OR HOPELESS: 0
SUM OF ALL RESPONSES TO PHQ QUESTIONS 1-9: 0
SUM OF ALL RESPONSES TO PHQ9 QUESTIONS 1 & 2: 0

## 2024-03-12 NOTE — PROGRESS NOTES
[facility] PR[facility]  [address] [place] [address] , [address]  [place], [place], [address]    Phyllis Black is a 63 y.o. female with  has a past medical history of Obesity.  Presented to the office today for:  Chief Complaint   Patient presents with    Diabetes    Hyperlipidemia    Hypertension       Assessment/Plan   1. Type 2 diabetes mellitus without complication, without long-term current use of insulin (HCC)  -     Semaglutide (RYBELSUS) 14 MG TABS; Take 14 mg by mouth daily, Disp-90 tablet, R-0Normal  2. Dyslipidemia  3. Hypertension, unspecified type [I10 (ICD-10-CM)]  4. Bronchitis  -     azithromycin (ZITHROMAX) 250 MG tablet; 500mg on day 1 followed by 250mg on days 2 - 5, Disp-6 tablet, R-0Normal  Return in about 3 months (around 6/12/2024) for F/U Med Management.    T2DM - continue w/ Synjardy 12.5-500mg and Rybelsus to 14mg  Continue to monitor glucose at home and diet/exercise to be persistent.  HTN - stable and at goal, cont w/ losartan at 25mg at this time  Hyperlipidemia - stable and at goal, continue w/  crestor at 5mg nightly.  Start Azithromycin  Symptomatic therapy suggested: push fluids, rest, gargle warm salt water, use vaporizer or mist prn and apply heat to sinuses prn. Nasal saline sprays PRN. Use Neti Pot PRN. Tylenol PRN for pain/fevers, Albuterol PRN for any shortness of breath/wheezing/mild dyspnea. Cepacol PRN for sore throat.  May consider additional w/u and management if needed, including CXR/advanced chest imaging, labs.  If there are any worsening or concerning signs or symptoms, patient will report to the ED and/or contact [phone] for immediate evaluation. Teach back method was used. All patient questions answered. Pt voiced understanding.      All patient questions answered.  Pt voiced understanding.   Medications Discontinued During This Encounter   Medication Reason    promethazine-dextromethorphan (PROMETHAZINE-DM) 6.25-15 MG/5ML syrup Therapy completed    Semaglutide

## 2024-04-29 RX ORDER — LOSARTAN POTASSIUM 25 MG/1
TABLET ORAL
Qty: 90 TABLET | Refills: 0 | Status: SHIPPED | OUTPATIENT
Start: 2024-04-29

## 2024-04-29 RX ORDER — EMPAGLIFLOZIN AND METFORMIN HYDROCHLORIDE 12.5; 5 MG/1; MG/1
1 TABLET ORAL 2 TIMES DAILY
Qty: 180 TABLET | Refills: 0 | Status: SHIPPED | OUTPATIENT
Start: 2024-04-29 | End: 2024-07-28

## 2024-04-29 RX ORDER — ROSUVASTATIN CALCIUM 5 MG/1
5 TABLET, COATED ORAL NIGHTLY
Qty: 90 TABLET | Refills: 0 | Status: SHIPPED | OUTPATIENT
Start: 2024-04-29 | End: 2024-07-28

## 2024-05-12 RX ORDER — ROSUVASTATIN CALCIUM 5 MG/1
5 TABLET, COATED ORAL NIGHTLY
Qty: 90 TABLET | Refills: 0 | Status: CANCELLED | OUTPATIENT
Start: 2024-05-12 | End: 2024-08-10

## 2024-05-13 RX ORDER — EMPAGLIFLOZIN AND METFORMIN HYDROCHLORIDE 12.5; 5 MG/1; MG/1
1 TABLET ORAL 2 TIMES DAILY
Qty: 180 TABLET | Refills: 0 | Status: SHIPPED | OUTPATIENT
Start: 2024-05-13 | End: 2024-08-11

## 2024-05-15 RX ORDER — ROSUVASTATIN CALCIUM 5 MG/1
5 TABLET, COATED ORAL NIGHTLY
Qty: 90 TABLET | Refills: 0 | OUTPATIENT
Start: 2024-05-15 | End: 2024-08-13

## 2024-06-15 DIAGNOSIS — E11.9 TYPE 2 DIABETES MELLITUS WITHOUT COMPLICATION, WITHOUT LONG-TERM CURRENT USE OF INSULIN (HCC): ICD-10-CM

## 2024-06-17 RX ORDER — ORAL SEMAGLUTIDE 14 MG/1
1 TABLET ORAL DAILY
Qty: 90 TABLET | Refills: 0 | Status: SHIPPED | OUTPATIENT
Start: 2024-06-17

## 2024-06-18 ENCOUNTER — OFFICE VISIT (OUTPATIENT)
Dept: PRIMARY CARE CLINIC | Age: 64
End: 2024-06-18
Payer: COMMERCIAL

## 2024-06-18 VITALS
HEIGHT: 62 IN | BODY MASS INDEX: 40.3 KG/M2 | HEART RATE: 95 BPM | WEIGHT: 219 LBS | OXYGEN SATURATION: 95 % | SYSTOLIC BLOOD PRESSURE: 118 MMHG | DIASTOLIC BLOOD PRESSURE: 72 MMHG

## 2024-06-18 DIAGNOSIS — I10 HYPERTENSION, UNSPECIFIED TYPE: ICD-10-CM

## 2024-06-18 DIAGNOSIS — E78.5 DYSLIPIDEMIA: ICD-10-CM

## 2024-06-18 DIAGNOSIS — Z12.11 ENCOUNTER FOR SCREENING FOR MALIGNANT NEOPLASM OF COLON: ICD-10-CM

## 2024-06-18 DIAGNOSIS — E11.9 TYPE 2 DIABETES MELLITUS WITHOUT COMPLICATION, WITHOUT LONG-TERM CURRENT USE OF INSULIN (HCC): Primary | ICD-10-CM

## 2024-06-18 PROCEDURE — G2211 COMPLEX E/M VISIT ADD ON: HCPCS | Performed by: STUDENT IN AN ORGANIZED HEALTH CARE EDUCATION/TRAINING PROGRAM

## 2024-06-18 PROCEDURE — 3074F SYST BP LT 130 MM HG: CPT | Performed by: STUDENT IN AN ORGANIZED HEALTH CARE EDUCATION/TRAINING PROGRAM

## 2024-06-18 PROCEDURE — 99214 OFFICE O/P EST MOD 30 MIN: CPT | Performed by: STUDENT IN AN ORGANIZED HEALTH CARE EDUCATION/TRAINING PROGRAM

## 2024-06-18 PROCEDURE — 3078F DIAST BP <80 MM HG: CPT | Performed by: STUDENT IN AN ORGANIZED HEALTH CARE EDUCATION/TRAINING PROGRAM

## 2024-06-18 RX ORDER — PHENTERMINE HYDROCHLORIDE 37.5 MG/1
37.5 TABLET ORAL
Qty: 30 TABLET | Refills: 0 | Status: SHIPPED | OUTPATIENT
Start: 2024-06-18 | End: 2024-07-18

## 2024-06-18 NOTE — PROGRESS NOTES
08/29/2023    CHOL 142 08/29/2023    TRIG 163 (H) 08/29/2023    HDL 51 08/29/2023    ALT 69 (H) 08/29/2023    AST 74 (H) 08/29/2023     08/29/2023    K 4.6 08/29/2023     08/29/2023    CREATININE 0.56 (L) 08/29/2023    BUN 11 08/29/2023    CO2 25 08/29/2023    TSH 0.519 08/29/2023    LABA1C 7.0 12/08/2023     Lab Results   Component Value Date    CALCIUM 9.5 08/29/2023     No results found for: \"LDLDIRECT\"    Please note that this chart was generated using voice recognition Dragon dictation software. Although every effort was made to ensure the accuracy of this automated transcription, some errors in transcription may have occurred.    Electronically signed by Dr. Cullen Monzon MD on 6/18/2024 at 9:37 AM

## 2024-06-28 LAB
ALBUMIN: 4.2 G/DL (ref 3.5–5.2)
ALK PHOSPHATASE: 71 U/L (ref 40–140)
ALT SERPL-CCNC: 52 U/L (ref 5–40)
ANION GAP SERPL CALCULATED.3IONS-SCNC: 11 MEQ/L (ref 7–16)
AST SERPL-CCNC: 45 U/L (ref 9–40)
BASOPHILS ABSOLUTE: 0.07 K/UL (ref 0–0.2)
BASOPHILS RELATIVE PERCENT: 0.9 %
BILIRUB SERPL-MCNC: 0.7 MG/DL
BUN BLDV-MCNC: 15 MG/DL (ref 8–23)
CALCIUM SERPL-MCNC: 9.2 MG/DL (ref 8.5–10.5)
CHLORIDE BLD-SCNC: 103 MEQ/L (ref 95–107)
CHOLESTEROL, TOTAL: 143 MG/DL
CHOLESTEROL/HDL RATIO: 2.6 RATIO
CO2: 25 MEQ/L (ref 19–31)
CREAT SERPL-MCNC: 0.69 MG/DL (ref 0.6–1.3)
EGFR IF NONAFRICAN AMERICAN: 99 ML/MIN/1.73
EOSINOPHILS ABSOLUTE: 0.23 K/UL (ref 0–0.5)
EOSINOPHILS RELATIVE PERCENT: 2.8 %
ESTIMATED AVERAGE GLUCOSE: 137 MG/DL
FOLATE: >20 UG/L
GLUCOSE: 112 MG/DL (ref 70–99)
HBA1C MFR BLD: 6.4 % (ref 4.2–5.6)
HCT VFR BLD CALC: 46.9 % (ref 34–45)
HDLC SERPL-MCNC: 54 MG/DL
HEMOGLOBIN: 15.9 G/DL (ref 11.5–15.5)
IMMATURE GRANS (ABS): 0.02
IMMATURE GRANULOCYTES %: 0.2 %
LDL CHOLESTEROL: 63 MG/DL
LDL/HDL RATIO: 1.2 RATIO
LYMPHOCYTES ABSOLUTE: 2.95 K/UL (ref 1–4)
LYMPHOCYTES RELATIVE PERCENT: 36.2 %
MCH RBC QN AUTO: 29.7 PG (ref 25–33)
MCHC RBC AUTO-ENTMCNC: 33.9 G/DL (ref 31–36)
MCV RBC AUTO: 87.5 FL (ref 80–99)
MONOCYTES ABSOLUTE: 0.51 K/UL (ref 0.2–1)
MONOCYTES RELATIVE PERCENT: 6.3 %
NEUTROPHILS ABSOLUTE: 4.37 K/UL (ref 1.5–7.5)
NEUTROPHILS RELATIVE PERCENT: 53.6 %
PDW BLD-RTO: 13.9 % (ref 11.5–15)
PLATELET # BLD: 188 K/UL (ref 130–400)
PMV BLD AUTO: 10.9 FL (ref 9.3–13)
POTASSIUM SERPL-SCNC: 4 MEQ/L (ref 3.5–5.4)
RBC # BLD: 5.36 M/UL (ref 3.8–5.4)
SODIUM BLD-SCNC: 139 MEQ/L (ref 133–146)
TOTAL PROTEIN: 7.1 G/DL (ref 6.1–8.3)
TRIGL SERPL-MCNC: 131 MG/DL
TSH SERPL DL<=0.05 MIU/L-ACNC: 0.52 UIU/ML (ref 0.4–4.1)
VITAMIN B-12: 785 PG/ML (ref 200–950)
VITAMIN D 25-HYDROXY: 30 NG/ML
VLDLC SERPL CALC-MCNC: 26 MG/DL
WBC # BLD: 8.2 K/UL (ref 3.5–11)

## 2024-07-18 PROBLEM — Z12.11 ENCOUNTER FOR SCREENING FOR MALIGNANT NEOPLASM OF COLON: Status: RESOLVED | Noted: 2024-06-18 | Resolved: 2024-07-18

## 2024-07-24 LAB — NONINV COLON CA DNA+OCC BLD SCRN STL QL: NEGATIVE

## 2024-08-07 RX ORDER — ROSUVASTATIN CALCIUM 5 MG/1
5 TABLET, COATED ORAL NIGHTLY
Qty: 90 TABLET | Refills: 0 | Status: SHIPPED | OUTPATIENT
Start: 2024-08-07 | End: 2024-09-11 | Stop reason: SDUPTHER

## 2024-08-07 RX ORDER — EMPAGLIFLOZIN AND METFORMIN HYDROCHLORIDE 12.5; 5 MG/1; MG/1
1 TABLET ORAL 2 TIMES DAILY
Qty: 180 TABLET | Refills: 0 | Status: SHIPPED | OUTPATIENT
Start: 2024-08-07 | End: 2024-09-11 | Stop reason: SDUPTHER

## 2024-08-07 RX ORDER — LOSARTAN POTASSIUM 25 MG/1
TABLET ORAL
Qty: 90 TABLET | Refills: 0 | Status: SHIPPED | OUTPATIENT
Start: 2024-08-07 | End: 2024-09-11 | Stop reason: SDUPTHER

## 2024-09-10 DIAGNOSIS — E11.9 TYPE 2 DIABETES MELLITUS WITHOUT COMPLICATION, WITHOUT LONG-TERM CURRENT USE OF INSULIN (HCC): ICD-10-CM

## 2024-09-11 RX ORDER — ORAL SEMAGLUTIDE 14 MG/1
1 TABLET ORAL DAILY
Qty: 90 TABLET | Refills: 0 | Status: SHIPPED | OUTPATIENT
Start: 2024-09-11

## 2024-10-02 ENCOUNTER — OFFICE VISIT (OUTPATIENT)
Dept: PRIMARY CARE CLINIC | Age: 64
End: 2024-10-02

## 2024-10-02 VITALS
DIASTOLIC BLOOD PRESSURE: 68 MMHG | HEIGHT: 62 IN | HEART RATE: 80 BPM | RESPIRATION RATE: 18 BRPM | WEIGHT: 222.2 LBS | BODY MASS INDEX: 40.89 KG/M2 | SYSTOLIC BLOOD PRESSURE: 118 MMHG

## 2024-10-02 DIAGNOSIS — E78.5 DYSLIPIDEMIA: ICD-10-CM

## 2024-10-02 DIAGNOSIS — Z23 NEED FOR INFLUENZA VACCINATION: ICD-10-CM

## 2024-10-02 DIAGNOSIS — Z12.31 ENCOUNTER FOR SCREENING MAMMOGRAM FOR BREAST CANCER: ICD-10-CM

## 2024-10-02 DIAGNOSIS — Z23 NEEDS FLU SHOT: ICD-10-CM

## 2024-10-02 DIAGNOSIS — E11.9 TYPE 2 DIABETES MELLITUS WITHOUT COMPLICATION, WITHOUT LONG-TERM CURRENT USE OF INSULIN (HCC): Primary | ICD-10-CM

## 2024-10-02 DIAGNOSIS — I10 HYPERTENSION, UNSPECIFIED TYPE: ICD-10-CM

## 2024-10-02 RX ORDER — TOPIRAMATE 25 MG/1
25 TABLET, FILM COATED ORAL NIGHTLY
Qty: 30 TABLET | Refills: 0 | Status: SHIPPED | OUTPATIENT
Start: 2024-10-02

## 2024-10-02 NOTE — PROGRESS NOTES
CO2 25 06/28/2024    TSH 0.519 06/28/2024    LABA1C 6.4 (H) 06/28/2024     Lab Results   Component Value Date    CALCIUM 9.2 06/28/2024     No results found for: \"LDLDIRECT\"    Please note that this chart was generated using voice recognition Dragon dictation software. Although every effort was made to ensure the accuracy of this automated transcription, some errors in transcription may have occurred.    Electronically signed by Dr. Cullen Monzon MD on 10/2/2024 at 10:07 AM

## 2024-10-30 RX ORDER — LOSARTAN POTASSIUM 25 MG/1
TABLET ORAL
Qty: 90 TABLET | Refills: 0 | Status: SHIPPED | OUTPATIENT
Start: 2024-10-30

## 2024-11-01 PROBLEM — Z12.31 ENCOUNTER FOR SCREENING MAMMOGRAM FOR BREAST CANCER: Status: RESOLVED | Noted: 2024-10-02 | Resolved: 2024-11-01

## 2024-11-01 PROBLEM — Z23 NEED FOR INFLUENZA VACCINATION: Status: RESOLVED | Noted: 2024-10-02 | Resolved: 2024-11-01

## 2024-11-21 DIAGNOSIS — E11.9 TYPE 2 DIABETES MELLITUS WITHOUT COMPLICATION, WITHOUT LONG-TERM CURRENT USE OF INSULIN (HCC): ICD-10-CM

## 2024-11-22 RX ORDER — ROSUVASTATIN CALCIUM 5 MG/1
5 TABLET, COATED ORAL NIGHTLY
Qty: 90 TABLET | Refills: 0 | Status: SHIPPED | OUTPATIENT
Start: 2024-11-22 | End: 2025-02-20

## 2024-11-22 RX ORDER — ORAL SEMAGLUTIDE 14 MG/1
1 TABLET ORAL DAILY
Qty: 90 TABLET | Refills: 0 | Status: SHIPPED | OUTPATIENT
Start: 2024-11-22

## 2024-11-22 RX ORDER — LOSARTAN POTASSIUM 25 MG/1
TABLET ORAL
Qty: 90 TABLET | Refills: 0 | Status: SHIPPED | OUTPATIENT
Start: 2024-11-22

## 2024-11-22 RX ORDER — EMPAGLIFLOZIN AND METFORMIN HYDROCHLORIDE 12.5; 5 MG/1; MG/1
1 TABLET ORAL 2 TIMES DAILY
Qty: 180 TABLET | Refills: 0 | Status: SHIPPED | OUTPATIENT
Start: 2024-11-22 | End: 2025-02-20

## 2025-01-03 LAB — DIABETIC RETINOPATHY: NEGATIVE

## 2025-01-05 ASSESSMENT — PATIENT HEALTH QUESTIONNAIRE - PHQ9
SUM OF ALL RESPONSES TO PHQ9 QUESTIONS 1 & 2: 2
SUM OF ALL RESPONSES TO PHQ QUESTIONS 1-9: 2
1. LITTLE INTEREST OR PLEASURE IN DOING THINGS: SEVERAL DAYS
SUM OF ALL RESPONSES TO PHQ QUESTIONS 1-9: 2
SUM OF ALL RESPONSES TO PHQ9 QUESTIONS 1 & 2: 2
2. FEELING DOWN, DEPRESSED OR HOPELESS: SEVERAL DAYS
SUM OF ALL RESPONSES TO PHQ QUESTIONS 1-9: 2
2. FEELING DOWN, DEPRESSED OR HOPELESS: SEVERAL DAYS
1. LITTLE INTEREST OR PLEASURE IN DOING THINGS: SEVERAL DAYS
SUM OF ALL RESPONSES TO PHQ QUESTIONS 1-9: 2

## 2025-01-08 ENCOUNTER — OFFICE VISIT (OUTPATIENT)
Dept: PRIMARY CARE CLINIC | Age: 65
End: 2025-01-08

## 2025-01-08 VITALS
HEIGHT: 62 IN | HEART RATE: 64 BPM | SYSTOLIC BLOOD PRESSURE: 110 MMHG | BODY MASS INDEX: 42.69 KG/M2 | WEIGHT: 232 LBS | RESPIRATION RATE: 18 BRPM | DIASTOLIC BLOOD PRESSURE: 62 MMHG

## 2025-01-08 DIAGNOSIS — E11.9 TYPE 2 DIABETES MELLITUS WITHOUT COMPLICATION, WITHOUT LONG-TERM CURRENT USE OF INSULIN (HCC): Primary | ICD-10-CM

## 2025-01-08 DIAGNOSIS — E78.5 DYSLIPIDEMIA: ICD-10-CM

## 2025-01-08 DIAGNOSIS — I10 HYPERTENSION, UNSPECIFIED TYPE: ICD-10-CM

## 2025-01-08 LAB — HBA1C MFR BLD: 7.3 %

## 2025-01-08 RX ORDER — PHENTERMINE HYDROCHLORIDE 37.5 MG/1
37.5 TABLET ORAL
Qty: 30 TABLET | Refills: 0 | Status: SHIPPED | OUTPATIENT
Start: 2025-01-08 | End: 2025-02-07

## 2025-01-08 SDOH — ECONOMIC STABILITY: FOOD INSECURITY: WITHIN THE PAST 12 MONTHS, THE FOOD YOU BOUGHT JUST DIDN'T LAST AND YOU DIDN'T HAVE MONEY TO GET MORE.: NEVER TRUE

## 2025-01-08 SDOH — ECONOMIC STABILITY: FOOD INSECURITY: WITHIN THE PAST 12 MONTHS, YOU WORRIED THAT YOUR FOOD WOULD RUN OUT BEFORE YOU GOT MONEY TO BUY MORE.: NEVER TRUE

## 2025-01-08 NOTE — PROGRESS NOTES
[facility] PRIMARY [facility]  [address] [place] [address] , [address]  [place], [place], [address]    Phyllis Black is a 64 y.o. female with  has a past medical history of Obesity.  Presented to the office today for:  Chief Complaint   Patient presents with    Diabetes    Hypertension    Hyperlipidemia       Assessment/Plan   1. Type 2 diabetes mellitus without complication, without long-term current use of insulin (HCC)  -     POCT glycosylated hemoglobin (Hb A1C)  -      DIABETES FOOT EXAM  2. Hypertension, unspecified type  3. Dyslipidemia  4. BMI 40.0-44.9, adult  -     phentermine (ADIPEX-P) 37.5 MG tablet; Take 1 tablet by mouth every morning (before breakfast) for 30 days. Max Daily Amount: 37.5 mg, Disp-30 tablet, R-0Normal  Return in about 1 month (around 2/8/2025) for F/U Med Management/Wt.  Assessment & Plan  T2DM - continue w/ Synjardy 12.5-500mg and Rybelsus to 14mg / alternatives discussed, Mounjaro today  Plan to start Adipex today.  Patient to start caloric restriction, 1500 kcals/day, and daily exercise 30+min per day.  Continue to monitor glucose at home and diet/exercise to be persistent.  HTN - stable and at goal, cont w/ losartan at 25mg at this time  Hyperlipidemia - stable and at goal, continue w/  crestor at 5mg nightly.  Obtain labs for monitoring  Local wound care /likely excoriation of the LD2    Last PDMP Dmitry as Reviewed:  Review User Review Instant Review Result   GLADIS HOLLAND 1/8/2025  9:52 AM Reviewed PDMP [1]         All patient questions answered.  Pt voiced understanding.   Medications Discontinued During This Encounter   Medication Reason    Tirzepatide (MOUNJARO) 2.5 MG/0.5ML SOPN SC injection LIST CLEANUP    topiramate (TOPAMAX) 25 MG tablet LIST CLEANUP       Patient received counseling on the following healthy behaviors: nutrition, exercise and medication adherence. I encouraged and discussed lifestyle modifications including diet and exercise (150+ minutes of moderate-high

## 2025-01-28 RX ORDER — LOSARTAN POTASSIUM 25 MG/1
TABLET ORAL
Qty: 90 TABLET | Refills: 3 | Status: SHIPPED | OUTPATIENT
Start: 2025-01-28

## 2025-02-06 LAB
ALBUMIN: 3.8 G/DL
ALP BLD-CCNC: 60 U/L
ALT SERPL-CCNC: 66 U/L
ANION GAP SERPL CALCULATED.3IONS-SCNC: 9 MMOL/L
AST SERPL-CCNC: 58 U/L
BASOPHILS ABSOLUTE: 0.1 /ΜL
BASOPHILS RELATIVE PERCENT: 0.9 %
BILIRUB SERPL-MCNC: 0.9 MG/DL (ref 0.1–1.4)
BUN BLDV-MCNC: 13 MG/DL
CALCIUM SERPL-MCNC: 8.8 MG/DL
CHLORIDE BLD-SCNC: 103 MMOL/L
CHOLESTEROL, TOTAL: 126 MG/DL
CHOLESTEROL/HDL RATIO: NORMAL
CO2: 25 MMOL/L
CREAT SERPL-MCNC: 0.66 MG/DL
CREATININE URINE: 71.8 MG/DL
EOSINOPHILS ABSOLUTE: 0.2 /ΜL
EOSINOPHILS RELATIVE PERCENT: 3.8 %
ESTIMATED AVERAGE GLUCOSE: 154
GFR, ESTIMATED: >60
GLUCOSE BLD-MCNC: 143 MG/DL
HBA1C MFR BLD: 7 %
HCT VFR BLD CALC: 45 % (ref 36–46)
HDLC SERPL-MCNC: 50 MG/DL (ref 35–70)
HEMOGLOBIN: 15.6 G/DL (ref 12–16)
LDL CHOLESTEROL: 52
LYMPHOCYTES ABSOLUTE: 2.3 /ΜL
LYMPHOCYTES RELATIVE PERCENT: 37.5 %
MCH RBC QN AUTO: 29.9 PG
MCHC RBC AUTO-ENTMCNC: 34.6 G/DL
MCV RBC AUTO: 86.4 FL
MICROALBUMIN/CREAT 24H UR: 16.3 MG/DL
MICROALBUMIN/CREAT UR-RTO: 22.7 MG/G
MONOCYTES ABSOLUTE: 0.4 /ΜL
MONOCYTES RELATIVE PERCENT: 6.3 %
NEUTROPHILS ABSOLUTE: 3.2 /ΜL
NEUTROPHILS RELATIVE PERCENT: 51.5 %
NONHDLC SERPL-MCNC: NORMAL MG/DL
PDW BLD-RTO: 14.9 %
PLATELET # BLD: 152 K/ΜL
PMV BLD AUTO: 9.3 FL
POTASSIUM SERPL-SCNC: 4.1 MMOL/L
RBC # BLD: 5.2 10^6/ΜL
SODIUM BLD-SCNC: 137 MMOL/L
TOTAL PROTEIN: 7.4 G/DL (ref 6.4–8.2)
TRIGL SERPL-MCNC: 118 MG/DL
VLDLC SERPL CALC-MCNC: 24 MG/DL
WBC # BLD: 6.2 10^3/ML

## 2025-02-09 SDOH — ECONOMIC STABILITY: FOOD INSECURITY: WITHIN THE PAST 12 MONTHS, THE FOOD YOU BOUGHT JUST DIDN'T LAST AND YOU DIDN'T HAVE MONEY TO GET MORE.: PATIENT DECLINED

## 2025-02-09 SDOH — ECONOMIC STABILITY: FOOD INSECURITY: WITHIN THE PAST 12 MONTHS, YOU WORRIED THAT YOUR FOOD WOULD RUN OUT BEFORE YOU GOT MONEY TO BUY MORE.: PATIENT DECLINED

## 2025-02-10 ENCOUNTER — OFFICE VISIT (OUTPATIENT)
Dept: PRIMARY CARE CLINIC | Age: 65
End: 2025-02-10
Payer: COMMERCIAL

## 2025-02-10 VITALS
BODY MASS INDEX: 41.96 KG/M2 | DIASTOLIC BLOOD PRESSURE: 62 MMHG | RESPIRATION RATE: 18 BRPM | SYSTOLIC BLOOD PRESSURE: 136 MMHG | HEIGHT: 62 IN | WEIGHT: 228 LBS | HEART RATE: 80 BPM

## 2025-02-10 DIAGNOSIS — E78.5 DYSLIPIDEMIA: ICD-10-CM

## 2025-02-10 DIAGNOSIS — E11.9 TYPE 2 DIABETES MELLITUS WITHOUT COMPLICATION, WITHOUT LONG-TERM CURRENT USE OF INSULIN (HCC): ICD-10-CM

## 2025-02-10 DIAGNOSIS — E11.9 TYPE 2 DIABETES MELLITUS WITHOUT COMPLICATION, WITHOUT LONG-TERM CURRENT USE OF INSULIN (HCC): Primary | ICD-10-CM

## 2025-02-10 DIAGNOSIS — I10 HYPERTENSION, UNSPECIFIED TYPE: ICD-10-CM

## 2025-02-10 PROCEDURE — 3075F SYST BP GE 130 - 139MM HG: CPT | Performed by: STUDENT IN AN ORGANIZED HEALTH CARE EDUCATION/TRAINING PROGRAM

## 2025-02-10 PROCEDURE — 99214 OFFICE O/P EST MOD 30 MIN: CPT | Performed by: STUDENT IN AN ORGANIZED HEALTH CARE EDUCATION/TRAINING PROGRAM

## 2025-02-10 PROCEDURE — 3078F DIAST BP <80 MM HG: CPT | Performed by: STUDENT IN AN ORGANIZED HEALTH CARE EDUCATION/TRAINING PROGRAM

## 2025-02-10 PROCEDURE — G2211 COMPLEX E/M VISIT ADD ON: HCPCS | Performed by: STUDENT IN AN ORGANIZED HEALTH CARE EDUCATION/TRAINING PROGRAM

## 2025-02-10 PROCEDURE — 3051F HG A1C>EQUAL 7.0%<8.0%: CPT | Performed by: STUDENT IN AN ORGANIZED HEALTH CARE EDUCATION/TRAINING PROGRAM

## 2025-02-10 NOTE — PROGRESS NOTES
[facility]  [address] [place] [address] , [address]  [place], [place], [address]    Phyllis Black is a 64 y.o. female with  has a past medical history of Obesity.  Presented to the office today for:  Chief Complaint   Patient presents with    Weight Management    Diabetes    Hypertension       Assessment/Plan   1. Type 2 diabetes mellitus without complication, without long-term current use of insulin (HCC)  2. Hypertension, unspecified type  3. Dyslipidemia  4. BMI 40.0-44.9, adult  Return in about 3 months (around 5/10/2025) for F/U Med Management.  Assessment & Plan    T2DM - continue w/ Synjardy 12.5-500mg and Rybelsus to 14mg / alternatives discussed, Mounjaro today was discussed as well.  Plan to start Adipex today / she did not start this yet, discussed RBA  Patient to start caloric restriction, 1500 kcals/day, and daily exercise 30+min per day.  Continue to monitor glucose at home and diet/exercise to be persistent.  HTN - stable and at goal, cont w/ losartan at 25mg at this time  Hyperlipidemia - stable and at goal, continue w/  crestor at 5mg nightly.       All patient questions answered.  Pt voiced understanding.   There are no discontinued medications.    Patient received counseling on the following healthy behaviors: nutrition, exercise and medication adherence. I encouraged and discussed lifestyle modifications including diet and exercise (150+ minutes of moderate-high intensity) and the patient was agreeable to making positive/beneficial changes to both to help improve their overall health. Discussed use, benefit, and side effects of prescribed medications.  Barriers to medication compliance addressed. Patient given educational materials: see patient instructions.     HM - HM items completed today as per orders. Outstanding HM items though not limited to immunizations were discussed with the patient today, including risks, benefits and alternatives. The patient will discuss these during

## 2025-02-16 DIAGNOSIS — E11.9 TYPE 2 DIABETES MELLITUS WITHOUT COMPLICATION, WITHOUT LONG-TERM CURRENT USE OF INSULIN: ICD-10-CM

## 2025-02-17 RX ORDER — ORAL SEMAGLUTIDE 14 MG/1
1 TABLET ORAL DAILY
Qty: 90 TABLET | Refills: 0 | Status: SHIPPED | OUTPATIENT
Start: 2025-02-17

## 2025-02-17 RX ORDER — ROSUVASTATIN CALCIUM 5 MG/1
5 TABLET, COATED ORAL NIGHTLY
Qty: 90 TABLET | Refills: 0 | Status: SHIPPED | OUTPATIENT
Start: 2025-02-17 | End: 2025-05-18

## 2025-02-17 RX ORDER — EMPAGLIFLOZIN AND METFORMIN HYDROCHLORIDE 12.5; 5 MG/1; MG/1
1 TABLET ORAL 2 TIMES DAILY
Qty: 180 TABLET | Refills: 0 | Status: SHIPPED | OUTPATIENT
Start: 2025-02-17 | End: 2025-03-02 | Stop reason: SDUPTHER

## 2025-03-03 RX ORDER — EMPAGLIFLOZIN AND METFORMIN HYDROCHLORIDE 12.5; 5 MG/1; MG/1
1 TABLET ORAL 2 TIMES DAILY
Qty: 180 TABLET | Refills: 1 | Status: SHIPPED | OUTPATIENT
Start: 2025-03-03 | End: 2025-06-01

## 2025-03-04 NOTE — TELEPHONE ENCOUNTER
Please review why - this was signed 2 + weeks ago      Last ordered: 2 weeks ago (2/17/2025) by Cullen Monzon MD       Thank you.  Electronically signed by Cullen Monzon MD on 3/3/2025 at 7:56 PM

## 2025-03-04 NOTE — TELEPHONE ENCOUNTER
Pharmacy was just requesting new Rx to have on file as the 02/17 Rx was filled but there were not refills.

## 2025-03-05 ENCOUNTER — TELEPHONE (OUTPATIENT)
Dept: PRIMARY CARE CLINIC | Age: 65
End: 2025-03-05

## 2025-03-05 NOTE — TELEPHONE ENCOUNTER
Pt states she was told by her insurance [employer]  that the Synjardy can be approved verbally by Dr Monzon if he calls [phone], states they will cover 100%, no cost for her if done. Pt states she is just about out of medication.

## 2025-03-06 NOTE — TELEPHONE ENCOUNTER
Hello,     I called [employer] and it took approx. 30minutes.    It is approved from 02/03/2025 until 03/05/2026 - 180 every 90 days, approx 1 year.    Ref [id number] - If needed for the Pharmacy (please call to let them know).    Thank you.  Electronically signed by Cullen Monzon MD on 3/5/2025 at 7:41 PM

## 2025-04-29 DIAGNOSIS — E11.9 TYPE 2 DIABETES MELLITUS WITHOUT COMPLICATION, WITHOUT LONG-TERM CURRENT USE OF INSULIN (HCC): ICD-10-CM

## 2025-04-29 RX ORDER — ORAL SEMAGLUTIDE 14 MG/1
1 TABLET ORAL DAILY
Qty: 90 TABLET | Refills: 2 | Status: SHIPPED | OUTPATIENT
Start: 2025-04-29

## 2025-04-29 RX ORDER — ROSUVASTATIN CALCIUM 5 MG/1
5 TABLET, COATED ORAL NIGHTLY
Qty: 90 TABLET | Refills: 3 | Status: SHIPPED | OUTPATIENT
Start: 2025-04-29 | End: 2025-07-28

## 2025-04-29 RX ORDER — EMPAGLIFLOZIN AND METFORMIN HYDROCHLORIDE 12.5; 5 MG/1; MG/1
1 TABLET ORAL 2 TIMES DAILY
Qty: 180 TABLET | Refills: 1 | Status: SHIPPED | OUTPATIENT
Start: 2025-04-29 | End: 2025-07-28

## 2025-04-29 RX ORDER — LOSARTAN POTASSIUM 25 MG/1
TABLET ORAL
Qty: 90 TABLET | Refills: 3 | Status: CANCELLED | OUTPATIENT
Start: 2025-04-29

## 2025-05-01 RX ORDER — LOSARTAN POTASSIUM 25 MG/1
TABLET ORAL
Qty: 90 TABLET | Refills: 3 | Status: SHIPPED | OUTPATIENT
Start: 2025-05-01

## 2025-05-01 RX ORDER — ROSUVASTATIN CALCIUM 5 MG/1
5 TABLET, COATED ORAL NIGHTLY
Qty: 90 TABLET | Refills: 3 | OUTPATIENT
Start: 2025-05-01 | End: 2025-07-30

## 2025-05-12 SDOH — ECONOMIC STABILITY: INCOME INSECURITY: IN THE LAST 12 MONTHS, WAS THERE A TIME WHEN YOU WERE NOT ABLE TO PAY THE MORTGAGE OR RENT ON TIME?: NO

## 2025-05-12 SDOH — ECONOMIC STABILITY: FOOD INSECURITY: WITHIN THE PAST 12 MONTHS, THE FOOD YOU BOUGHT JUST DIDN'T LAST AND YOU DIDN'T HAVE MONEY TO GET MORE.: NEVER TRUE

## 2025-05-12 SDOH — ECONOMIC STABILITY: FOOD INSECURITY: WITHIN THE PAST 12 MONTHS, YOU WORRIED THAT YOUR FOOD WOULD RUN OUT BEFORE YOU GOT MONEY TO BUY MORE.: PATIENT DECLINED

## 2025-05-13 ENCOUNTER — OFFICE VISIT (OUTPATIENT)
Dept: PRIMARY CARE CLINIC | Age: 65
End: 2025-05-13
Payer: COMMERCIAL

## 2025-05-13 VITALS
OXYGEN SATURATION: 95 % | DIASTOLIC BLOOD PRESSURE: 78 MMHG | WEIGHT: 228.4 LBS | BODY MASS INDEX: 41.77 KG/M2 | HEART RATE: 96 BPM | SYSTOLIC BLOOD PRESSURE: 126 MMHG

## 2025-05-13 DIAGNOSIS — Z12.31 BREAST CANCER SCREENING BY MAMMOGRAM: ICD-10-CM

## 2025-05-13 DIAGNOSIS — E78.5 DYSLIPIDEMIA: ICD-10-CM

## 2025-05-13 DIAGNOSIS — M25.561 ACUTE PAIN OF RIGHT KNEE: ICD-10-CM

## 2025-05-13 DIAGNOSIS — I10 HYPERTENSION, UNSPECIFIED TYPE: ICD-10-CM

## 2025-05-13 DIAGNOSIS — E11.9 TYPE 2 DIABETES MELLITUS WITHOUT COMPLICATION, WITHOUT LONG-TERM CURRENT USE OF INSULIN (HCC): Primary | ICD-10-CM

## 2025-05-13 PROCEDURE — 3074F SYST BP LT 130 MM HG: CPT | Performed by: STUDENT IN AN ORGANIZED HEALTH CARE EDUCATION/TRAINING PROGRAM

## 2025-05-13 PROCEDURE — 3078F DIAST BP <80 MM HG: CPT | Performed by: STUDENT IN AN ORGANIZED HEALTH CARE EDUCATION/TRAINING PROGRAM

## 2025-05-13 PROCEDURE — 99214 OFFICE O/P EST MOD 30 MIN: CPT | Performed by: STUDENT IN AN ORGANIZED HEALTH CARE EDUCATION/TRAINING PROGRAM

## 2025-05-13 PROCEDURE — 3051F HG A1C>EQUAL 7.0%<8.0%: CPT | Performed by: STUDENT IN AN ORGANIZED HEALTH CARE EDUCATION/TRAINING PROGRAM

## 2025-05-13 PROCEDURE — G2211 COMPLEX E/M VISIT ADD ON: HCPCS | Performed by: STUDENT IN AN ORGANIZED HEALTH CARE EDUCATION/TRAINING PROGRAM

## 2025-05-13 NOTE — PROGRESS NOTES
[facility] PRIMARY [facility]ARE  [address] [place] [address] , [address]  [place], [place], [address]    Phyllis Black is a 64 y.o. female with  has a past medical history of Obesity.  Presented to the office today for:  Chief Complaint   Patient presents with    Diabetes    Hypertension    Knee Pain       Assessment/Plan   1. Type 2 diabetes mellitus without complication, without long-term current use of insulin (HCC)  2. Hypertension, unspecified type  3. Dyslipidemia  4. BMI 40.0-44.9, adult (HCC)  5. Acute pain of right knee  -     XR KNEE RIGHT (3 VIEWS); Future  6. Breast cancer screening by mammogram  -     [facility] CHANTELL DIGITAL SCREEN BILATERAL; Future  Return in about 3 months (around 8/13/2025) for F/U Med Management.  Assessment & Plan  We discussed some of the etiologies and natural histories. We discussed the various treatment alternatives including anti-inflammatory medications, physical therapy, injections, further imaging studies and as a last resort surgery.    T2DM - continue w/ Synjardy 12.5-500mg and Rybelsus to 14mg / alternatives discussed,   Patient to start caloric restriction, 1500 kcals/day, and daily exercise 30+min per day to be continued  Continue to monitor glucose at home and diet/exercise to be persistent.  HTN - stable and at goal, cont w/ losartan at 25mg at this time  Hyperlipidemia - stable and at goal, continue w/  crestor at 5mg nightly.    Last PDMP Dmitry as Reviewed:  Review User Review Instant Review Result   GLADIS HOLLAND 5/13/2025  9:55 AM Reviewed PDMP [1]         All patient questions answered.  Pt voiced understanding.   Medications Discontinued During This Encounter   Medication Reason    losartan (COZAAR) 25 MG tablet LIST CLEANUP       Patient received counseling on the following healthy behaviors: nutrition, exercise and medication adherence. I encouraged and discussed lifestyle modifications including diet and exercise (150+ minutes of moderate-high intensity) and the patient

## 2025-08-14 ENCOUNTER — OFFICE VISIT (OUTPATIENT)
Dept: PRIMARY CARE CLINIC | Age: 65
End: 2025-08-14

## 2025-08-14 VITALS
RESPIRATION RATE: 16 BRPM | HEIGHT: 63 IN | OXYGEN SATURATION: 96 % | WEIGHT: 228 LBS | BODY MASS INDEX: 40.4 KG/M2 | SYSTOLIC BLOOD PRESSURE: 132 MMHG | DIASTOLIC BLOOD PRESSURE: 72 MMHG | HEART RATE: 93 BPM

## 2025-08-14 DIAGNOSIS — H61.21 IMPACTED CERUMEN OF RIGHT EAR: ICD-10-CM

## 2025-08-14 DIAGNOSIS — E78.5 DYSLIPIDEMIA: ICD-10-CM

## 2025-08-14 DIAGNOSIS — R21 RASH AND NONSPECIFIC SKIN ERUPTION: ICD-10-CM

## 2025-08-14 DIAGNOSIS — E11.9 TYPE 2 DIABETES MELLITUS WITHOUT COMPLICATION, WITHOUT LONG-TERM CURRENT USE OF INSULIN (HCC): Primary | ICD-10-CM

## 2025-08-14 DIAGNOSIS — I10 HYPERTENSION, UNSPECIFIED TYPE: ICD-10-CM

## 2025-08-14 RX ORDER — MUPIROCIN 2 %
OINTMENT (GRAM) TOPICAL 3 TIMES DAILY
Qty: 30 G | Refills: 0 | Status: SHIPPED | OUTPATIENT
Start: 2025-08-14 | End: 2025-08-28

## 2025-08-14 RX ORDER — EMPAGLIFLOZIN AND METFORMIN HYDROCHLORIDE 12.5; 5 MG/1; MG/1
1 TABLET ORAL 2 TIMES DAILY
COMMUNITY
Start: 2025-05-30

## 2025-08-14 RX ORDER — LOSARTAN POTASSIUM 25 MG/1
25 TABLET ORAL DAILY
COMMUNITY
Start: 2025-08-05